# Patient Record
Sex: MALE | Race: WHITE | NOT HISPANIC OR LATINO | Employment: FULL TIME | URBAN - METROPOLITAN AREA
[De-identification: names, ages, dates, MRNs, and addresses within clinical notes are randomized per-mention and may not be internally consistent; named-entity substitution may affect disease eponyms.]

---

## 2018-02-02 ENCOUNTER — OFFICE VISIT (OUTPATIENT)
Dept: MEDICAL GROUP | Facility: MEDICAL CENTER | Age: 33
End: 2018-02-02
Payer: COMMERCIAL

## 2018-02-02 VITALS
OXYGEN SATURATION: 96 % | TEMPERATURE: 98.1 F | HEART RATE: 86 BPM | BODY MASS INDEX: 29.44 KG/M2 | WEIGHT: 187.6 LBS | SYSTOLIC BLOOD PRESSURE: 112 MMHG | DIASTOLIC BLOOD PRESSURE: 68 MMHG | HEIGHT: 67 IN | RESPIRATION RATE: 16 BRPM

## 2018-02-02 DIAGNOSIS — Z76.89 ENCOUNTER TO ESTABLISH CARE: ICD-10-CM

## 2018-02-02 DIAGNOSIS — Z00.00 HEALTH CARE MAINTENANCE: ICD-10-CM

## 2018-02-02 DIAGNOSIS — G25.81 RLS (RESTLESS LEGS SYNDROME): ICD-10-CM

## 2018-02-02 DIAGNOSIS — F31.9 BIPOLAR 1 DISORDER (HCC): ICD-10-CM

## 2018-02-02 DIAGNOSIS — B20 HIV (HUMAN IMMUNODEFICIENCY VIRUS INFECTION) (HCC): ICD-10-CM

## 2018-02-02 PROCEDURE — 99204 OFFICE O/P NEW MOD 45 MIN: CPT | Performed by: INTERNAL MEDICINE

## 2018-02-02 RX ORDER — HYDROXYZINE HYDROCHLORIDE 25 MG/1
25 TABLET, FILM COATED ORAL 3 TIMES DAILY PRN
COMMUNITY
End: 2019-06-25

## 2018-02-02 RX ORDER — LAMOTRIGINE 200 MG/1
200 TABLET ORAL DAILY
COMMUNITY
End: 2018-12-24

## 2018-02-02 RX ORDER — PRAMIPEXOLE DIHYDROCHLORIDE 0.5 MG/1
0.5 TABLET ORAL 3 TIMES DAILY
COMMUNITY
End: 2019-06-25

## 2018-02-02 ASSESSMENT — PATIENT HEALTH QUESTIONNAIRE - PHQ9
6. FEELING BAD ABOUT YOURSELF - OR THAT YOU ARE A FAILURE OR HAVE LET YOURSELF OR YOUR FAMILY DOWN: 1
9. THOUGHTS THAT YOU WOULD BE BETTER OFF DEAD, OR OF HURTING YOURSELF: 0
CLINICAL INTERPRETATION OF PHQ2 SCORE: 0
3. TROUBLE FALLING OR STAYING ASLEEP OR SLEEPING TOO MUCH: 0
2. FEELING DOWN, DEPRESSED, IRRITABLE, OR HOPELESS: 1
1. LITTLE INTEREST OR PLEASURE IN DOING THINGS: 0
5. POOR APPETITE OR OVEREATING: 0
7. TROUBLE CONCENTRATING ON THINGS, SUCH AS READING THE NEWSPAPER OR WATCHING TELEVISION: 0
SUM OF ALL RESPONSES TO PHQ QUESTIONS 1-9: 2
4. FEELING TIRED OR HAVING LITTLE ENERGY: 0
SUM OF ALL RESPONSES TO PHQ9 QUESTIONS 1 AND 2: 1
8. MOVING OR SPEAKING SO SLOWLY THAT OTHER PEOPLE COULD HAVE NOTICED. OR THE OPPOSITE, BEING SO FIGETY OR RESTLESS THAT YOU HAVE BEEN MOVING AROUND A LOT MORE THAN USUAL: 0

## 2018-02-02 NOTE — LETTER
[facility]  Melissa Hernandez M.D.  [address]  [facility] [address]  Fax: [phone]   Authorization for Release/Disclosure of   Protected Health Information   Name: MAURICE ARANA : 1985 SSN: [id number]   Address: [address] [place] [address] Phone:    [phone] (home)    I authorize the entity listed below to release/disclose the PHI below to:   [facility]/Melissa Hernandez M.D. and Melissa Hernandez M.D.   Provider or Entity Name:     Address   City, State, Zip   Phone:      Fax:     Reason for request: continuity of care   Information to be released:    [  ] LAST COLONOSCOPY,  including any PATH REPORT and follow-up  [  ] LAST FIT/COLOGUARD RESULT [  ] LAST DEXA  [  ] LAST MAMMOGRAM  [  ] LAST PAP  [  ] LAST LABS [  ] RETINA EXAM REPORT  [  ] IMMUNIZATION RECORDS  [ x ] Release all info      [  ] Check here and initial the line next to each item to release ALL health information INCLUDING  _____ Care and treatment for drug and / or alcohol abuse  _____ HIV testing, infection status, or AIDS  _____ Genetic Testing    DATES OF SERVICE OR TIME PERIOD TO BE DISCLOSED: _____________  I understand and acknowledge that:  * This Authorization may be revoked at any time by you in writing, except if your health information has already been used or disclosed.  * Your health information that will be used or disclosed as a result of you signing this authorization could be re-disclosed by the recipient. If this occurs, your re-disclosed health information may no longer be protected by State or Federal laws.  * You may refuse to sign this Authorization. Your refusal will not affect your ability to obtain treatment.  * This Authorization becomes effective upon signing and will  on (date) __________.      If no date is indicated, this Authorization will  one (1) year from the signature date.    Name: Maurice Arana    Signature:   Date:     2018          PLEASE FAX REQUESTED RECORDS BACK TO: [phone]

## 2018-02-02 NOTE — PROGRESS NOTES
CHIEF COMPLAINT  Chief Complaint   Patient presents with   • Establish Care   HIV    HPI  Patient is a 32 y.o. male patient who presents today for the following     HIV  Dg: in 2007  The last labs: 5/17  Denies:  - Skin rash  - Fatigue  - Swollen glands  - Frequent infections  Medications: Gbcnkqvlzy-Dsvktapl-Ldamytc AF (ODEFSEY) 200-25-25 MG Tab  He has been followed up by ID.    RLS  The patient has history of restless leg syndrome, on Mirapex, 0.5 mg that;  - Symptoms controlled.     Bipolar  Onset:  Since childhood   Mood currently does not affect: work, relationships, social activities.  Counseling: Y  Current medications:  lamotrigine 200 mg daily    Risk factors:   · Depression  · H/o phobia: social, agoraphobia: no  · H/o panic attacks: no  · H/o hypomanic or manic episode: yes  · Substance abuse  (alcohol,  prescription drugs caffeine, tobacco): no  · Family support: yes  · Living alone:  no  · Family history of psych disorders: mother depression; sister; both with suicide attempt  · Stress: no   · PMH of abuse (sexual, physical, emotional abuse; neglect): no    LEONID-7 score:  1/18   1. Feeling nervous, anxious, or on edge     2. Not being able to stop or control worrying    3. Worrying too much about different things    4. Trouble relaxing    5. Being so restless that it is hard to sit still    6. Becoming easily annoyed or irritable    7. Feeling afraid as if something awful might happen    Total score 0     Depression Screen (PHQ-2/PHQ-9) 2/2/2018 2/2/2018   PHQ-2 Total Score - 1   PHQ-2 Total Score 0 -   PHQ-9 Total Score - 2     Suicidal ideation: denies wishing to be dead, thinking about death, intent to commit suicide, previous suicide attempt.    Reviewed PMH, PSH, FH, SH, ALL, HCM/IMM, no changes  Reviewed MEDS, no changes    Patient Active Problem List    Diagnosis Date Noted   • HIV (human immunodeficiency virus infection) (CMS-HCC) 02/02/2018     CURRENT MEDICATIONS  Current Outpatient  "Prescriptions   Medication Sig Dispense Refill   • Cpjxofrfre-Ygdmfecx-Znniniy AF (ODEFSEY) 200-25-25 MG Tab Take  by mouth.     • pramipexole (MIRAPEX) 0.5 MG Tab Take 0.5 mg by mouth 3 times a day.     • hydrOXYzine HCl (ATARAX) 25 MG Tab Take 25 mg by mouth 3 times a day as needed for Itching.     • lamotrigine (LAMICTAL) 200 MG tablet Take 200 mg by mouth every day.       No current facility-administered medications for this visit.      ALLERGIES  Allergies: Amoxicillin  PAST MEDICAL HISTORY  Past Medical History:   Diagnosis Date   • HIV (human immunodeficiency virus infection) (CMS-HCC)      SURGICAL HISTORY  He  has no past surgical history on file.  SOCIAL HISTORY  Social History   Substance Use Topics   • Smoking status: Never Smoker   • Smokeless tobacco: Never Used   • Alcohol use 1.2 oz/week     2 Cans of beer per week     Social History     Social History Narrative   • No narrative on file     FAMILY HISTORY  Family History   Problem Relation Age of Onset   • Thyroid Mother    • Hypertension Father    • Blood Disease Sister      Sicle cell carrier     Family Status   Relation Status   • Mother    • Father    • Sister      ROS   Constitutional: Negative for fever, chills.  HENT: Negative for congestion, sore throat.  Eyes: Negative for blurred vision.   Respiratory: Negative for cough, shortness of breath.  Cardiovascular: Negative for chest pain, palpitations.   Gastrointestinal: Negative for heartburn, nausea, abdominal pain.   Genitourinary: Negative for dysuria.  Musculoskeletal: Negative for significant myalgias, back pain and joint pain.   Skin: Negative for rash and itching.   Neuro: Negative for dizziness, weakness and headaches. And per HPI.  Endo/Heme/Allergies: Does not bruise/bleed easily.   ID: as above.  Psychiatric/Behavioral: as above.    PHYSICAL EXAM   Blood pressure 112/68, pulse 86, temperature 36.7 °C (98.1 °F), resp. rate 16, height 1.702 m (5' 7\"), weight 85.1 kg (187 lb 9.6 oz), " SpO2 96 %. Body mass index is 29.38 kg/m².  General:  NAD, well appearing  HEENT:   NC/AT, PERRLA, EOMI, TMs are clear. Oropharyngeal mucosa is pink,  without lesions;  no cervical / supraclavicular  lymphadenopathy, no thyromegaly.    Cardiovascular: RRR.   No m/r/g. No carotid bruits .      Lungs:   CTAB, no w/r/r, no respiratory distress.  Abdomen: Soft, NT/ND + BS; no suprapubic tenderness; no masses or hepatosplenomegaly.  Extremities:  2+ DP and radial pulses bilaterally.  No c/c/e.   Skin:  Warm, dry.  No erythema. No rash.   Neurologic: Alert & oriented x 3.  No focal deficits.  Psychiatric:  Affect normal, mood normal, judgment normal.    LABS     Pending.    IMAGING     None    ASSESMENT AND PLAN        1. HIV (human immunodeficiency virus infection) (CMS-HCC)  Stable, no symptoms.    - REFERRAL TO INFECTIOUS DISEASE  - CBC WITH DIFFERENTIAL; Future  - COMP METABOLIC PANEL; Future  - CD4/CD8 RATIO PROFILE; Future    Medication refill:  - Otcnotlcyy-Ilmynlad-Hgcvink AF (ODEFSEY) 200-25-25 MG Tab; Take 1 Tab by mouth every bedtime.  Dispense: 90 Tab; Refill: 0  - [facility] HIV VIRAL LOAD RNA QT - REF  - HIV ANTIBODIES; Future    2. Bipolar 1 disorder (CMS-HCC)  Control, continue current treatment  - REFERRAL TO PSYCHIATRY    3. RLS (restless legs syndrome)  Controlled, continue current treatment    4. Health care maintenance  Pending records    5. Encounter to establish care  Reviewed PMH, PSH, FH, SH, ALL, MEDS, HCM/IMM.   Advised healthy habits, diet, exercise.  Release form for old records: signed    Counseling:   - Smoking:  Nonsmoker    Followup: Return in about 6 months (around 8/2/2018) for Short.    All questions are answered.    Please note that this dictation was created using voice recognition software, and that there might be errors of jocelyn and possibly content.

## 2018-02-05 ENCOUNTER — TELEPHONE (OUTPATIENT)
Dept: INTERNAL MEDICINE | Facility: MEDICAL CENTER | Age: 33
End: 2018-02-05

## 2018-02-05 DIAGNOSIS — B20 HIV DISEASE (HCC): ICD-10-CM

## 2018-02-05 NOTE — TELEPHONE ENCOUNTER
HIV referral received, I reviewed chart and I added HIV RNA to labs to obtain prior to appointment

## 2018-02-16 NOTE — TELEPHONE ENCOUNTER
Called pt and l/m. Dr Reno has referred him to Dr Miles. Dr Miles ordered additional lab test. We just need all labs done prior to scheduling an appt. Also asking pt to return my call.     Cc: Dr Melissa Hernandez

## 2018-02-21 NOTE — TELEPHONE ENCOUNTER
Called pt and l/m. Asking for him to return my call to schedule an appt with Dr Miles and he needs to get labs done prior to making his appt. That I will hold on to his referral until the beginning of March, then I will shred the referral.

## 2018-02-28 ENCOUNTER — TELEPHONE (OUTPATIENT)
Dept: MEDICAL GROUP | Facility: MEDICAL CENTER | Age: 33
End: 2018-02-28

## 2018-03-01 NOTE — TELEPHONE ENCOUNTER
Phone Number Called: [phone] (home)      Message: Patient called and left message. I have called patient back but it has gone straight to  and I have left a message for him to call us back.     Left Message for patient to call back: yes

## 2018-03-02 NOTE — TELEPHONE ENCOUNTER
Called and spoke with pt. Notified pt he would need to get labs done prior to his appt. He said he will get them done. He will call me back on Monday to schedule an appt.(He said he has to look at his work schedule first.)

## 2018-03-09 ENCOUNTER — TELEPHONE (OUTPATIENT)
Dept: MEDICAL GROUP | Facility: MEDICAL CENTER | Age: 33
End: 2018-03-09

## 2018-03-09 NOTE — TELEPHONE ENCOUNTER
Phone Number Called: [phone]     Message: Isabel called pharmacy to fill the rx Javy    Left Message for patient to call back: no

## 2018-03-09 NOTE — TELEPHONE ENCOUNTER
Called and spoke with pt. Asked if he was able to get the labs done. He said he has not. But he will. I notify him this would be my last attempt to remind him to have labs done. He would need to notify me when lab work has been completed.  I told him than I will notify Dr Miles to review pt's lab results/referral. He understood.

## 2018-03-12 DIAGNOSIS — B20 HIV (HUMAN IMMUNODEFICIENCY VIRUS INFECTION) (HCC): ICD-10-CM

## 2018-09-06 DIAGNOSIS — B20 HIV (HUMAN IMMUNODEFICIENCY VIRUS INFECTION) (HCC): ICD-10-CM

## 2018-09-06 RX ORDER — POLYMYXIN B SULFATE, BACITRACIN ZINC AND NEOMYCIN SULFATE 400; 3.5; 5 [USP'U]/G; MG/G; [USP'U]/G
1 OINTMENT TOPICAL
Qty: 83 TAB | Refills: 0 | Status: SHIPPED | OUTPATIENT
Start: 2018-09-06 | End: 2018-12-24 | Stop reason: SDUPTHER

## 2018-09-06 NOTE — TELEPHONE ENCOUNTER
Was the patient seen in the last year in this department? Yes 2/2/18    Does patient have an active prescription for medications requested? Yes    Received Request Via: Pharmacy

## 2018-12-24 ENCOUNTER — OFFICE VISIT (OUTPATIENT)
Dept: MEDICAL GROUP | Facility: MEDICAL CENTER | Age: 33
End: 2018-12-24
Payer: COMMERCIAL

## 2018-12-24 VITALS
BODY MASS INDEX: 28.79 KG/M2 | SYSTOLIC BLOOD PRESSURE: 120 MMHG | OXYGEN SATURATION: 99 % | TEMPERATURE: 98.3 F | RESPIRATION RATE: 12 BRPM | WEIGHT: 183.42 LBS | HEIGHT: 67 IN | DIASTOLIC BLOOD PRESSURE: 70 MMHG | HEART RATE: 54 BPM

## 2018-12-24 DIAGNOSIS — B20 HIV (HUMAN IMMUNODEFICIENCY VIRUS INFECTION) (HCC): ICD-10-CM

## 2018-12-24 DIAGNOSIS — F31.9 BIPOLAR 1 DISORDER (HCC): ICD-10-CM

## 2018-12-24 DIAGNOSIS — G25.81 RLS (RESTLESS LEGS SYNDROME): ICD-10-CM

## 2018-12-24 DIAGNOSIS — Z00.00 HEALTH CARE MAINTENANCE: ICD-10-CM

## 2018-12-24 PROCEDURE — 99214 OFFICE O/P EST MOD 30 MIN: CPT | Performed by: INTERNAL MEDICINE

## 2018-12-24 ASSESSMENT — PATIENT HEALTH QUESTIONNAIRE - PHQ9
1. LITTLE INTEREST OR PLEASURE IN DOING THINGS: 0
2. FEELING DOWN, DEPRESSED, IRRITABLE, OR HOPELESS: 0
SUM OF ALL RESPONSES TO PHQ9 QUESTIONS 1 AND 2: 0

## 2018-12-24 NOTE — PROGRESS NOTES
CHIEF COMPLAINT  Chief Complaint   Patient presents with   • Medication Refill   HIV    HPI  Patient is a 33 y.o. male patient who presents today for the following     HIV  Dg: in 2007  The last labs: 5/17  Denies:  - Skin rash  - Fatigue  - Swollen glands  - Frequent infections  Medications: Lrmvygynhq-Ojfdzirt-Chgecnq AF (Odefsey) 200-25-25 Mg Tab  He had been followed up by ID in the past.     RLS  The patient has history of restless leg syndrome, on Mirapex, 0.5 mg that;  - Symptoms controlled.      Bipolar  Onset: Since childhood   Mood does not affect: work, relationships, social activities.  Counseling: Y  Current medications: lamotrigine 200 mg daily     Risk factors:   • Depression  • H/o phobia: social, agoraphobia: no  • H/o panic attacks: no  • H/o hypomanic or manic episode: yes  • Substance abuse (alcohol, prescription drugs caffeine, tobacco): no  • Family support: yes  • Living alone: no  • Family history of psych disorders: mother depression; sister; both with suicide attempt  • Stress: no   • PMH of abuse (sexual, physical, emotional abuse; neglect): no     LEONID-7 score:  12/18   1. Feeling nervous, anxious, or on edge                2. Not being able to stop or control worrying     3. Worrying too much about different things     4. Trouble relaxing     5. Being so restless that it is hard to sit still     6. Becoming easily annoyed or irritable     7. Feeling afraid as if something awful might happen     Total score 0      Depression Screen (PHQ-2/PHQ-9) 2/2/2018 2/2/2018 12/24/2018   PHQ-2 Total Score - 1 0   PHQ-2 Total Score 0 - -   PHQ-9 Total Score - 2 -     Reviewed PMH, PSH, FH, SH, ALL, HCM/IMM, no changes  Reviewed MEDS, no changes    Patient Active Problem List    Diagnosis Date Noted   • HIV (human immunodeficiency virus infection) ([facility]) 02/02/2018   • Bipolar 1 disorder ([facility]) 02/02/2018   • RLS (restless legs syndrome) 02/02/2018   • Health care maintenance 02/02/2018     CURRENT  MEDICATIONS  Current Outpatient Prescriptions   Medication Sig Dispense Refill   • Ucvnxkdfrl-Dxgitetu-Mcxwmhq AF (ODEFSEY) 200-25-25 MG Tab Take 1 Tab by mouth every bedtime. 83 Tab 1   • pramipexole (MIRAPEX) 0.5 MG Tab Take 0.5 mg by mouth 3 times a day.     • hydrOXYzine HCl (ATARAX) 25 MG Tab Take 25 mg by mouth 3 times a day as needed for Itching.     • lamotrigine (LAMICTAL) 200 MG tablet Take 200 mg by mouth every day.       No current facility-administered medications for this visit.      ALLERGIES  Allergies: Amoxicillin  PAST MEDICAL HISTORY  Past Medical History:   Diagnosis Date   • HIV (human immunodeficiency virus infection) (HCC)      SURGICAL HISTORY  He  has no past surgical history on file.  SOCIAL HISTORY  Social History   Substance Use Topics   • Smoking status: Never Smoker   • Smokeless tobacco: Never Used   • Alcohol use 1.2 oz/week     2 Cans of beer per week     Social History     Social History Narrative   • No narrative on file     FAMILY HISTORY  Family History   Problem Relation Age of Onset   • Thyroid Mother    • Hypertension Father    • Blood Disease Sister         Sicle cell carrier     Family Status   Relation Status   • Mo (Not Specified)   • Fa (Not Specified)   • Sis (Not Specified)       ROS   Constitutional: Negative for fever, chills.  HENT: Negative for congestion, sore throat.  Eyes: Negative for blurred vision.   Respiratory: Negative for cough, shortness of breath.  Cardiovascular: Negative for chest pain, palpitations.   Gastrointestinal: Negative for heartburn, nausea, abdominal pain.   Genitourinary: Negative for dysuria.  Musculoskeletal: Negative for significant myalgias, back pain and joint pain.   Skin: Negative for rash and itching.   Neuro: Negative for dizziness, weakness and headaches.   Endo/Heme/Allergies: Does not bruise/bleed easily.   Inf: as above.  Psychiatric/Behavioral: as above.    PHYSICAL EXAM   Blood pressure 120/70, pulse (!) 54, temperature 36.8  "°C (98.3 °F), temperature source Temporal, resp. rate 12, height 1.702 m (5' 7\"), weight 83.2 kg (183 lb 6.8 oz), SpO2 99 %. Body mass index is 28.73 kg/m².  General:  NAD, well appearing  HEENT:   NC/AT, PERRLA, EOMI, TMs are clear. Oropharyngeal mucosa is pink,  without lesions;  no cervical / supraclavicular  lymphadenopathy, no thyromegaly.    Cardiovascular: RRR.   No m/r/g. No carotid bruits .      Lungs:   CTAB, no w/r/r, no respiratory distress.  Abdomen: Soft, NT/ND + BS; no suprapubic tenderness; no masses or hepatosplenomegaly.  Extremities:  2+ DP and radial pulses bilaterally.  No c/c/e.   Skin:  Warm, dry.  No erythema. No rash.   Neurologic: Alert & oriented x 3.  No focal deficits.  Psychiatric:  Affect normal, mood normal, judgment normal.    LABS     Pending    IMAGING     None    ASSESMENT AND PLAN        1. HIV (human immunodeficiency virus infection) (HCC)  Pending labs:  - Bzjnsbuclz-Kjylnwtt-Qzrqftb AF (ODEFSEY) 200-25-25 MG Tab; Take 1 Tab by mouth every bedtime.  Dispense: 83 Tab; Refill: 1  - CBC WITH DIFFERENTIAL; Future  - CD4/CD8 RATIO PROFILE; Future    2. RLS (restless legs syndrome)  Controlled, continue current treatment    3. Bipolar 1 disorder (HCC)  Controlled, continue current treatment    4. Health care maintenance  Advised immunizations x4    Counseling:   - Smoking:  Nonsmoker    Followup: Return in about 6 months (around 6/24/2019), or if symptoms worsen or fail to improve.    Time spent 25 minutes face to face, with > 50% spent counseling and coordinating care.  All questions are answered.    Please note that this dictation was created using voice recognition software, and that there might be errors of jocelyn and possibly content.    "

## 2019-02-20 ENCOUNTER — HOSPITAL ENCOUNTER (OUTPATIENT)
Dept: LAB | Facility: MEDICAL CENTER | Age: 34
End: 2019-02-20
Attending: INTERNAL MEDICINE
Payer: COMMERCIAL

## 2019-02-20 DIAGNOSIS — B20 HIV (HUMAN IMMUNODEFICIENCY VIRUS INFECTION) (HCC): ICD-10-CM

## 2019-02-20 LAB
BASOPHILS # BLD AUTO: 0.6 % (ref 0–1.8)
BASOPHILS # BLD: 0.03 K/UL (ref 0–0.12)
EOSINOPHIL # BLD AUTO: 0.04 K/UL (ref 0–0.51)
EOSINOPHIL NFR BLD: 0.8 % (ref 0–6.9)
ERYTHROCYTE [DISTWIDTH] IN BLOOD BY AUTOMATED COUNT: 43.8 FL (ref 35.9–50)
HCT VFR BLD AUTO: 50 % (ref 42–52)
HGB BLD-MCNC: 17 G/DL (ref 14–18)
IMM GRANULOCYTES # BLD AUTO: 0.01 K/UL (ref 0–0.11)
IMM GRANULOCYTES NFR BLD AUTO: 0.2 % (ref 0–0.9)
LYMPHOCYTES # BLD AUTO: 1.71 K/UL (ref 1–4.8)
LYMPHOCYTES NFR BLD: 33.6 % (ref 22–41)
MCH RBC QN AUTO: 31 PG (ref 27–33)
MCHC RBC AUTO-ENTMCNC: 34 G/DL (ref 33.7–35.3)
MCV RBC AUTO: 91.2 FL (ref 81.4–97.8)
MONOCYTES # BLD AUTO: 0.36 K/UL (ref 0–0.85)
MONOCYTES NFR BLD AUTO: 7.1 % (ref 0–13.4)
NEUTROPHILS # BLD AUTO: 2.94 K/UL (ref 1.82–7.42)
NEUTROPHILS NFR BLD: 57.7 % (ref 44–72)
NRBC # BLD AUTO: 0 K/UL
NRBC BLD-RTO: 0 /100 WBC
PLATELET # BLD AUTO: 195 K/UL (ref 164–446)
PMV BLD AUTO: 9.9 FL (ref 9–12.9)
RBC # BLD AUTO: 5.48 M/UL (ref 4.7–6.1)
WBC # BLD AUTO: 5.1 K/UL (ref 4.8–10.8)

## 2019-02-20 PROCEDURE — 36415 COLL VENOUS BLD VENIPUNCTURE: CPT

## 2019-02-20 PROCEDURE — 85025 COMPLETE CBC W/AUTO DIFF WBC: CPT

## 2019-02-20 PROCEDURE — 86359 T CELLS TOTAL COUNT: CPT

## 2019-02-20 PROCEDURE — 86360 T CELL ABSOLUTE COUNT/RATIO: CPT

## 2019-02-23 LAB
ANNOTATION COMMENT IMP: NORMAL
CD3 CELLS # BLD: 1145 CELLS/UL (ref 570–2400)
CD3+CD4+ CELLS # BLD: 610 CELLS/UL (ref 430–1800)
CD3+CD4+ CELLS/CD3+CD8+ CLL BLD: 1.09 RATIO (ref 0.8–3.9)
CD3+CD8+ CELLS # BLD: 561 CELLS/UL (ref 210–1200)

## 2019-02-25 ENCOUNTER — TELEPHONE (OUTPATIENT)
Dept: MEDICAL GROUP | Facility: MEDICAL CENTER | Age: 34
End: 2019-02-25

## 2019-02-26 NOTE — TELEPHONE ENCOUNTER
----- Message from Melissa Hernandez M.D. sent at 2/24/2019  5:25 PM PST -----  Please, notify pt that labs ar enormal, to repeta before the next OV, thanks. Dr Reno

## 2019-02-26 NOTE — TELEPHONE ENCOUNTER
Phone Number Called: [phone] (home)     Message: LM for patient to call back    Left Message for patient to call back: yes

## 2019-02-28 NOTE — TELEPHONE ENCOUNTER
Phone Number Called: [phone] (home)     Message: Patient informed of normal results    Left Message for patient to call back: yes

## 2019-06-25 ENCOUNTER — HOSPITAL ENCOUNTER (OUTPATIENT)
Dept: LAB | Facility: MEDICAL CENTER | Age: 34
End: 2019-06-25
Attending: INTERNAL MEDICINE
Payer: COMMERCIAL

## 2019-06-25 ENCOUNTER — OFFICE VISIT (OUTPATIENT)
Dept: MEDICAL GROUP | Facility: MEDICAL CENTER | Age: 34
End: 2019-06-25
Payer: COMMERCIAL

## 2019-06-25 VITALS
OXYGEN SATURATION: 94 % | WEIGHT: 186.51 LBS | HEART RATE: 63 BPM | BODY MASS INDEX: 29.27 KG/M2 | DIASTOLIC BLOOD PRESSURE: 60 MMHG | HEIGHT: 67 IN | TEMPERATURE: 96.9 F | SYSTOLIC BLOOD PRESSURE: 102 MMHG

## 2019-06-25 DIAGNOSIS — F31.9 BIPOLAR 1 DISORDER (HCC): ICD-10-CM

## 2019-06-25 DIAGNOSIS — B20 HIV (HUMAN IMMUNODEFICIENCY VIRUS INFECTION) (HCC): ICD-10-CM

## 2019-06-25 DIAGNOSIS — G25.81 RLS (RESTLESS LEGS SYNDROME): ICD-10-CM

## 2019-06-25 DIAGNOSIS — Z00.00 HEALTH CARE MAINTENANCE: ICD-10-CM

## 2019-06-25 LAB
ALBUMIN SERPL BCP-MCNC: 4 G/DL (ref 3.2–4.9)
ALBUMIN/GLOB SERPL: 1.6 G/DL
ALP SERPL-CCNC: 69 U/L (ref 30–99)
ALT SERPL-CCNC: 17 U/L (ref 2–50)
ANION GAP SERPL CALC-SCNC: 7 MMOL/L (ref 0–11.9)
AST SERPL-CCNC: 20 U/L (ref 12–45)
BASOPHILS # BLD AUTO: 0.7 % (ref 0–1.8)
BASOPHILS # BLD: 0.04 K/UL (ref 0–0.12)
BILIRUB SERPL-MCNC: 0.7 MG/DL (ref 0.1–1.5)
BUN SERPL-MCNC: 12 MG/DL (ref 8–22)
CALCIUM SERPL-MCNC: 8.8 MG/DL (ref 8.5–10.5)
CHLORIDE SERPL-SCNC: 109 MMOL/L (ref 96–112)
CO2 SERPL-SCNC: 23 MMOL/L (ref 20–33)
CREAT SERPL-MCNC: 0.86 MG/DL (ref 0.5–1.4)
EOSINOPHIL # BLD AUTO: 0.07 K/UL (ref 0–0.51)
EOSINOPHIL NFR BLD: 1.2 % (ref 0–6.9)
ERYTHROCYTE [DISTWIDTH] IN BLOOD BY AUTOMATED COUNT: 40.8 FL (ref 35.9–50)
GLOBULIN SER CALC-MCNC: 2.5 G/DL (ref 1.9–3.5)
GLUCOSE SERPL-MCNC: 95 MG/DL (ref 65–99)
HCT VFR BLD AUTO: 46.5 % (ref 42–52)
HGB BLD-MCNC: 15.7 G/DL (ref 14–18)
IMM GRANULOCYTES # BLD AUTO: 0.03 K/UL (ref 0–0.11)
IMM GRANULOCYTES NFR BLD AUTO: 0.5 % (ref 0–0.9)
LYMPHOCYTES # BLD AUTO: 1.55 K/UL (ref 1–4.8)
LYMPHOCYTES NFR BLD: 27.5 % (ref 22–41)
MCH RBC QN AUTO: 30.6 PG (ref 27–33)
MCHC RBC AUTO-ENTMCNC: 33.8 G/DL (ref 33.7–35.3)
MCV RBC AUTO: 90.6 FL (ref 81.4–97.8)
MONOCYTES # BLD AUTO: 0.37 K/UL (ref 0–0.85)
MONOCYTES NFR BLD AUTO: 6.6 % (ref 0–13.4)
NEUTROPHILS # BLD AUTO: 3.57 K/UL (ref 1.82–7.42)
NEUTROPHILS NFR BLD: 63.5 % (ref 44–72)
NRBC # BLD AUTO: 0 K/UL
NRBC BLD-RTO: 0 /100 WBC
PLATELET # BLD AUTO: 166 K/UL (ref 164–446)
PMV BLD AUTO: 9.3 FL (ref 9–12.9)
POTASSIUM SERPL-SCNC: 4 MMOL/L (ref 3.6–5.5)
PROT SERPL-MCNC: 6.5 G/DL (ref 6–8.2)
RBC # BLD AUTO: 5.13 M/UL (ref 4.7–6.1)
SODIUM SERPL-SCNC: 139 MMOL/L (ref 135–145)
WBC # BLD AUTO: 5.6 K/UL (ref 4.8–10.8)

## 2019-06-25 PROCEDURE — 36415 COLL VENOUS BLD VENIPUNCTURE: CPT

## 2019-06-25 PROCEDURE — 99214 OFFICE O/P EST MOD 30 MIN: CPT | Performed by: INTERNAL MEDICINE

## 2019-06-25 PROCEDURE — 85025 COMPLETE CBC W/AUTO DIFF WBC: CPT

## 2019-06-25 PROCEDURE — 86359 T CELLS TOTAL COUNT: CPT

## 2019-06-25 PROCEDURE — 80053 COMPREHEN METABOLIC PANEL: CPT

## 2019-06-25 PROCEDURE — 86360 T CELL ABSOLUTE COUNT/RATIO: CPT

## 2019-06-25 ASSESSMENT — PATIENT HEALTH QUESTIONNAIRE - PHQ9: CLINICAL INTERPRETATION OF PHQ2 SCORE: 0

## 2019-06-25 NOTE — PROGRESS NOTES
CHIEF COMPLAINT  Chief Complaint   Patient presents with   • Follow-Up     6 months   HIV    HPI  Maurice Arana is a 33 y.o. male who presents today for the following     HIV  Dg: in 2007  The last labs: 2/2019    Component      Latest Ref Rng & Units 2/20/2019   Cd4 -T4 [place] Cells      430 - 1800 cells/uL 610   Cd8 -T8 Suppressor Cells      210 - 1200 cells/uL 561   Cd3 Ct      570 - 2400 cells/uL 1145   Cd4-Cd8 Ratio      0.80 - 3.90 ratio 1.09   Interpretation       See Note     Denies:  - Skin rash  - Fatigue  - Swollen glands  - Frequent infections  Medications: Mctejpevmf-Gucfuufk-Cmazbaq AF (Odefsey) 200-25-25 Mg Tab  He had been followed up by ID in the past.     RLS  The patient has history of restless leg syndrome, was on Mirapex, 0.5 mg;  - Symptoms controlled.      Bipolar  Interval course  -Stable   -Does not feel depressed or anxious  No medications    Onset: Since childhood   Mood does not affect: work, relationships, social activities.  Counseling: Y  Previous medication: Was lamotrigine 200 mg daily  Current medications: none     Risk factors:   • Depression  • H/o phobia: social, agoraphobia: no  • H/o panic attacks: no  • H/o hypomanic or manic episode: yes  • Substance abuse (alcohol, prescription drugs caffeine, tobacco): no  • Family support: yes  • Living alone: no  • Family history of psych disorders: mother depression; sister; both with suicide attempt  • Stress: no   • PMH of abuse (sexual, physical, emotional abuse; neglect): no     LEONID-7 score:  6/19   1. Feeling nervous, anxious, or on edge      2. Not being able to stop or control worrying     3. Worrying too much about different things     4. Trouble relaxing     5. Being so restless that it is hard to sit still     6. Becoming easily annoyed or irritable     7. Feeling afraid as if something awful might happen     Total score 0     Reviewed PMH, PSH, FH, SH, ALL, HCM/IMM  Reviewed MEDS, updated    Patient Active Problem List     Diagnosis Date Noted   • HIV (human immunodeficiency virus infection) ([facility]) 02/02/2018   • Bipolar 1 disorder ([facility]) 02/02/2018   • RLS (restless legs syndrome) 02/02/2018   • Health care maintenance 02/02/2018     CURRENT MEDICATIONS  Current Outpatient Prescriptions   Medication Sig Dispense Refill   • Idsqconqce-Yiqasktx-Faayhhg AF (ODEFSEY) 200-25-25 MG Tab Take 1 Tab by mouth every bedtime. 83 Tab 0     No current facility-administered medications for this visit.      ALLERGIES  Allergies: Amoxicillin  PAST MEDICAL HISTORY  Past Medical History:   Diagnosis Date   • HIV (human immunodeficiency virus infection) ([facility])      SURGICAL HISTORY  He  has no past surgical history on file.  SOCIAL HISTORY  Social History   Substance Use Topics   • Smoking status: Never Smoker   • Smokeless tobacco: Never Used   • Alcohol use 1.2 oz/week     2 Cans of beer per week     Social History     Social History Narrative   • No narrative on file     FAMILY HISTORY  Family History   Problem Relation Age of Onset   • Thyroid Mother    • Hypertension Father    • Blood Disease Sister         Sicle cell carrier   • Diabetes Maternal Grandfather      Family Status   Relation Status   • Mo (Not Specified)   • Fa (Not Specified)   • Sis (Not Specified)   • MGFa (Not Specified)     ROS   Constitutional: Negative for fever, chills, fatigue.  HENT: Negative for congestion, sore throat.  Eyes: Negative for vision problems.   Respiratory: Negative for cough, shortness of breath.  Cardiovascular: Negative for chest pain, palpitations.   Gastrointestinal: Negative for heartburn, nausea, abdominal pain.   Genitourinary: Negative for dysuria.  Musculoskeletal: Negative for significant myalgia, back and joint pain.   Skin: Negative for rash.   Neuro: Negative for dizziness, weakness and headaches.   Endo/Heme/Allergies: Does not bruise/bleed easily.   Psychiatric/Behavioral: Negative for depression.    PHYSICAL EXAM   /60 (BP Cuff Size:  "Adult)   Pulse 63   Temp 36.1 °C (96.9 °F) (Temporal)   Ht 1.702 m (5' 7\")   Wt 84.6 kg (186 lb 8.2 oz)   SpO2 94%  Body mass index is 29.21 kg/m².  General:  NAD, well appearing  HEENT:   NC/AT, PERRLA, EOMI, TMs are clear. Oropharyngeal mucosa is pink,  without lesions;  no cervical / supraclavicular  lymphadenopathy, no thyromegaly.    Cardiovascular: RRR.   No m/r/g.       Lungs:   CTAB, no w/r/r, no respiratory distress.  Abdomen: Soft, NT/ND; no hepatosplenomegaly.  Extremities:  2+ DP and radial pulses bilaterally.  No c/c/e.   Skin:  Warm, dry.  No erythema. No rash.   Neurologic: Alert & oriented x 3. CN II-XII grossly intact. No focal deficits.  Psychiatric:  Affect normal, mood normal, judgment normal.    Labs     Labs are reviewed and discussed with a patient    Lab Results   Component Value Date/Time    WBC 5.1 02/20/2019 11:06 AM    RBC 5.48 02/20/2019 11:06 AM    HEMOGLOBIN 17.0 02/20/2019 11:06 AM    HEMATOCRIT 50.0 02/20/2019 11:06 AM    MCV 91.2 02/20/2019 11:06 AM    MCH 31.0 02/20/2019 11:06 AM    MCHC 34.0 02/20/2019 11:06 AM    MPV 9.9 02/20/2019 11:06 AM    NEUTSPOLYS 57.70 02/20/2019 11:06 AM    LYMPHOCYTES 33.60 02/20/2019 11:06 AM    MONOCYTES 7.10 02/20/2019 11:06 AM    EOSINOPHILS 0.80 02/20/2019 11:06 AM    BASOPHILS 0.60 02/20/2019 11:06 AM      Imaging     None    Assessment and Plan     Maurice Arana is a 33 y.o. male    1. HIV (human immunodeficiency virus infection) (HCC)  Asymptomatic, pending labs now and in 3m:    - CBC WITH DIFFERENTIAL; Future  - CD4/CD8 RATIO PROFILE; Future  - Wslwfdhcwx-Amebnyuj-Ibqcpcl AF (ODEFSEY) 200-25-25 MG Tab; Take 1 Tab by mouth every bedtime.  Dispense: 83 Tab; Refill: 0  - REFERRAL TO INFECTIOUS DISEASE  - CBC WITH DIFFERENTIAL; Future  - CD4/CD8 RATIO PROFILE; Future  - Comp Metabolic Panel; Future  - Comp Metabolic Panel; Future    2. RLS (restless legs syndrome)  Controlled, no meds    3. Bipolar 1 disorder (HCC)  No sx, no meds, will " be f/u.    4. Health care maintenance  Pending records    Counseling:   - Smoking:  Nonsmoker    Followup: Return in about 3 months (around 9/25/2019).    All questions are answered.    Please note that this dictation was created using voice recognition software, and that there might be errors of jocelyn and possibly content.

## 2019-06-27 LAB
ANNOTATION COMMENT IMP: NORMAL
CD3 CELLS # BLD: 1109 CELLS/UL (ref 570–2400)
CD3+CD4+ CELLS # BLD: 617 CELLS/UL (ref 430–1800)
CD3+CD4+ CELLS/CD3+CD8+ CLL BLD: 1.25 RATIO (ref 0.8–3.9)
CD3+CD8+ CELLS # BLD: 495 CELLS/UL (ref 210–1200)

## 2019-07-17 DIAGNOSIS — B20 HIV (HUMAN IMMUNODEFICIENCY VIRUS INFECTION) (HCC): ICD-10-CM

## 2019-09-08 ENCOUNTER — OFFICE VISIT (OUTPATIENT)
Dept: URGENT CARE | Facility: CLINIC | Age: 34
End: 2019-09-08
Payer: COMMERCIAL

## 2019-09-08 VITALS
SYSTOLIC BLOOD PRESSURE: 112 MMHG | RESPIRATION RATE: 16 BRPM | HEIGHT: 67 IN | WEIGHT: 189 LBS | BODY MASS INDEX: 29.66 KG/M2 | TEMPERATURE: 98.1 F | OXYGEN SATURATION: 98 % | DIASTOLIC BLOOD PRESSURE: 68 MMHG | HEART RATE: 60 BPM

## 2019-09-08 DIAGNOSIS — H10.31 ACUTE BACTERIAL CONJUNCTIVITIS OF RIGHT EYE: ICD-10-CM

## 2019-09-08 PROCEDURE — 99214 OFFICE O/P EST MOD 30 MIN: CPT | Performed by: NURSE PRACTITIONER

## 2019-09-08 RX ORDER — POLYMYXIN B SULFATE AND TRIMETHOPRIM 1; 10000 MG/ML; [USP'U]/ML
1 SOLUTION OPHTHALMIC EVERY 4 HOURS
Qty: 10 ML | Refills: 0 | Status: SHIPPED | OUTPATIENT
Start: 2019-09-08 | End: 2019-09-15

## 2019-09-08 NOTE — PROGRESS NOTES
Chief Complaint   Patient presents with   • [place] [facility]     x1 day, (R) eye tingling sensation, redness, crusty discharge, itchy       HISTORY OF PRESENT ILLNESS: Patient is a 33 y.o. male who presents to urgent care today with complaints of right eye irritation.  The patient notes that for the past 2 days he has had redness, drainage, crusting, itching to the right eye.  He denies any eye pain, photophobia, or visual changes.  He has tried OTC Visine for treatment.  He denies associated runny nose, fever, headache.  He does not wear contacts or glasses.    Patient Active Problem List    Diagnosis Date Noted   • HIV (human immunodeficiency virus infection) (Formerly [facility]) 02/02/2018   • Bipolar 1 disorder (Formerly [facility]) 02/02/2018   • RLS (restless legs syndrome) 02/02/2018   • Health care maintenance 02/02/2018       Allergies:Amoxicillin    Current Outpatient Medications Ordered in Epic   Medication Sig Dispense Refill   • polymixin-trimethoprim (POLYTRIM) 09572-4.1 UNIT/ML-% Solution Place 1 Drop in both eyes every 4 hours for 7 days. 10 mL 0   • Gzgcaumtnl-Ofnlbrkm-Rycbnty AF (ODEFSEY) 200-25-25 MG Tab Take 1 Tab by mouth every bedtime. 90 Tab 0     No current Epic-ordered facility-administered medications on file.        Past Medical History:   Diagnosis Date   • HIV (human immunodeficiency virus infection) (Formerly [facility])        Social History     Tobacco Use   • Smoking status: Never Smoker   • Smokeless tobacco: Never Used   Substance Use Topics   • Alcohol use: Yes     Alcohol/week: 1.2 oz     Types: 2 Cans of beer per week   • Drug use: No       Family Status   Relation Name Status   • Mo  (Not Specified)   • Fa  (Not Specified)   • Sis  (Not Specified)   • MGFa  (Not Specified)     Family History   Problem Relation Age of Onset   • Thyroid Mother    • Hypertension Father    • Blood Disease Sister         Sicle cell carrier   • Diabetes Maternal Grandfather        ROS:  Review of Systems   Constitutional: Negative for fever, chills,  "weight loss, malaise, and fatigue.   HENT: Negative for ear pain, nosebleeds, congestion, sore throat and neck pain.    Eyes: Positive for right eye redness, drainage, irritation, crusting, itching.  Negative for eye pain, photophobia, vision changes.   Neuro: Negative for headache, sensory changes, weakness, seizure, LOC.   Cardiovascular: Negative for chest pain, palpitations, orthopnea and leg swelling.   Respiratory: Negative for cough, sputum production, shortness of breath and wheezing.   Gastrointestinal: Negative for abdominal pain, nausea, vomiting or diarrhea.   Musculoskeletal: Negative for falls, neck pain, back pain, joint pain, myalgias.   Skin: Negative for rash, diaphoresis.     Exam:  /68   Pulse 60   Temp 36.7 °C (98.1 °F) (Temporal)   Resp 16   Ht 1.702 m (5' 7\")   Wt 85.7 kg (189 lb)   SpO2 98%   General: well-nourished, well-developed male in NAD  Head: normocephalic, atraumatic  Eyes: PERRLA, right-sided conjunctival injection, crusting noted, lids normal.  Ears: normal shape and symmetry, no tenderness, no discharge. External canals are without any significant edema or erythema. Tympanic membranes are without any inflammation, no effusion. Gross auditory acuity is intact.  Nose: symmetrical without tenderness, no discharge.  Mouth/Throat: reasonable hygiene, no erythema, exudates or tonsillar enlargement.  Neck: no masses, range of motion within normal limits, no tracheal deviation. No obvious thyroid enlargement.   Lymph: no cervical adenopathy. No supraclavicular adenopathy.   Neuro: alert and oriented. Cranial nerves 1-12 grossly intact. No sensory deficit.   Cardiovascular: regular rate and rhythm. No edema.  Pulmonary: no distress. Chest is symmetrical with respiration, no wheezes, crackles, or rhonchi.   Musculoskeletal: no clubbing, appropriate muscle tone, gait is stable.  Skin: warm, dry, intact, no clubbing, no cyanosis, no rashes.   Psych: appropriate mood, affect, " judgement.         Assessment/Plan:  1. Acute bacterial conjunctivitis of right eye  polymixin-trimethoprim (POLYTRIM) 10675-8.1 UNIT/ML-% Solution       Suspect conjunctivitis, Polytrim as directed.  If no improvement in 48 hours, instructed to seek care from an eye specialist.  Hand and eye hygiene addressed.  Supportive care, differential diagnoses, and indications for immediate follow-up discussed with patient.   Pathogenesis of diagnosis discussed including typical length and natural progression.   Instructed to return to clinic or nearest emergency department for any change in condition, further concerns, or worsening of symptoms.  Patient states understanding of the plan of care and discharge instructions.          Please note that this dictation was created using voice recognition software. I have made every reasonable attempt to correct obvious errors, but I expect that there are errors of grammar and possibly content that I did not discover before finalizing the note.      ALEJANDRA Lipscomb.

## 2019-09-24 ENCOUNTER — APPOINTMENT (OUTPATIENT)
Dept: MEDICAL GROUP | Facility: MEDICAL CENTER | Age: 34
End: 2019-09-24
Payer: COMMERCIAL

## 2020-02-16 ENCOUNTER — APPOINTMENT (OUTPATIENT)
Dept: RADIOLOGY | Facility: IMAGING CENTER | Age: 35
End: 2020-02-16
Attending: PHYSICIAN ASSISTANT
Payer: COMMERCIAL

## 2020-02-16 ENCOUNTER — OFFICE VISIT (OUTPATIENT)
Dept: URGENT CARE | Facility: CLINIC | Age: 35
End: 2020-02-16
Payer: COMMERCIAL

## 2020-02-16 VITALS
HEART RATE: 78 BPM | HEIGHT: 67 IN | BODY MASS INDEX: 29.82 KG/M2 | WEIGHT: 190 LBS | SYSTOLIC BLOOD PRESSURE: 122 MMHG | RESPIRATION RATE: 18 BRPM | OXYGEN SATURATION: 98 % | TEMPERATURE: 98.5 F | DIASTOLIC BLOOD PRESSURE: 80 MMHG

## 2020-02-16 DIAGNOSIS — S80.01XA TRAUMATIC HEMATOMA OF RIGHT KNEE, INITIAL ENCOUNTER: ICD-10-CM

## 2020-02-16 PROCEDURE — 73564 X-RAY EXAM KNEE 4 OR MORE: CPT | Mod: TC,FY,RT | Performed by: PHYSICIAN ASSISTANT

## 2020-02-16 PROCEDURE — 99214 OFFICE O/P EST MOD 30 MIN: CPT | Performed by: PHYSICIAN ASSISTANT

## 2020-02-16 ASSESSMENT — ENCOUNTER SYMPTOMS
BLURRED VISION: 0
NAUSEA: 0
SEIZURES: 0
DIARRHEA: 0
ORTHOPNEA: 0
TINGLING: 0
TREMORS: 0
LOSS OF CONSCIOUSNESS: 0
CLAUDICATION: 0
SENSORY CHANGE: 0
DIZZINESS: 0
CHILLS: 0
DOUBLE VISION: 0
VOMITING: 0
HEADACHES: 0
ABDOMINAL PAIN: 0
FOCAL WEAKNESS: 0
FEVER: 0
SHORTNESS OF BREATH: 0
PALPITATIONS: 0
WEAKNESS: 0
SPEECH CHANGE: 0
COUGH: 0

## 2020-02-16 NOTE — PATIENT INSTRUCTIONS
Hematoma  A hematoma is a collection of blood under the skin, in an organ, in a body space, in a joint space, or in other tissue. The blood can clot to form a lump that you can see and feel. The lump is often firm and may sometimes become sore and tender. Most hematomas get better in a few days to weeks. However, some hematomas may be serious and require medical care. Hematomas can range in size from very small to very large.  What are the causes?  A hematoma can be caused by a blunt or penetrating injury. It can also be caused by spontaneous leakage from a blood vessel under the skin. Spontaneous leakage from a blood vessel is more likely to occur in older people, especially those taking blood thinners. Sometimes, a hematoma can develop after certain medical procedures.  What are the signs or symptoms?  · A firm lump on the body.  · Possible pain and tenderness in the area.  · Bruising. Blue, dark blue, purple-red, or yellowish skin may appear at the site of the hematoma if the hematoma is close to the surface of the skin.  For hematomas in deeper tissues or body spaces, the signs and symptoms may be subtle. For example, an intra-abdominal hematoma may cause abdominal pain, weakness, fainting, and shortness of breath. An intracranial hematoma may cause a headache or symptoms such as weakness, trouble speaking, or a change in consciousness.  How is this diagnosed?  A hematoma can usually be diagnosed based on your medical history and a physical exam. Imaging tests may be needed if your health care provider suspects a hematoma in deeper tissues or body spaces, such as the abdomen, head, or chest. These tests may include ultrasonography or a CT scan.  How is this treated?  Hematomas usually go away on their own over time. Rarely does the blood need to be drained out of the body. Large hematomas or those that may affect vital organs will sometimes need surgical drainage or monitoring.  Follow these instructions at  home:  · Apply ice to the injured area:  ¨ Put ice in a plastic bag.  ¨ Place a towel between your skin and the bag.  ¨ Leave the ice on for 20 minutes, 2-3 times a day for the first 1 to 2 days.  · After the first 2 days, switch to using warm compresses on the hematoma.  · Elevate the injured area to help decrease pain and swelling. Wrapping the area with an elastic bandage may also be helpful. Compression helps to reduce swelling and promotes shrinking of the hematoma. Make sure the bandage is not wrapped too tight.  · If your hematoma is on a lower extremity and is painful, crutches may be helpful for a couple days.  · Only take over-the-counter or prescription medicines as directed by your health care provider.  Get help right away if:  · You have increasing pain, or your pain is not controlled with medicine.  · You have a fever.  · You have worsening swelling or discoloration.  · Your skin over the hematoma breaks or starts bleeding.  · Your hematoma is in your chest or abdomen and you have weakness, shortness of breath, or a change in consciousness.  · Your hematoma is on your scalp (caused by a fall or injury) and you have a worsening headache or a change in alertness or consciousness.  This information is not intended to replace advice given to you by your health care provider. Make sure you discuss any questions you have with your health care provider.  Document Released: 08/01/2005 Document Revised: 05/25/2017 Document Reviewed: 05/28/2014  [employer] Patient Education © 2017 [employer]

## 2020-02-16 NOTE — PROGRESS NOTES
Subjective:      Maurice Arana is a 34 y.o. male who presents with Knee Injury (Week ago hit right knee, bruise and sore, yesterday hit left knee)            Knee Injury   This is a new problem. The current episode started in the past 7 days (hit in leg with soft ball). The problem occurs constantly. The problem has been unchanged. Pertinent negatives include no abdominal pain, chest pain, chills, coughing, fever, headaches, nausea, rash, vomiting or weakness. Nothing aggravates the symptoms. He has tried nothing for the symptoms.       Review of Systems   Constitutional: Negative for chills and fever.   Eyes: Negative for blurred vision and double vision.   Respiratory: Negative for cough and shortness of breath.    Cardiovascular: Negative for chest pain, palpitations, orthopnea, claudication and leg swelling.   Gastrointestinal: Negative for abdominal pain, diarrhea, nausea and vomiting.   Musculoskeletal:        Right knee pain   Skin: Negative for rash.   Neurological: Negative for dizziness, tingling, tremors, sensory change, speech change, focal weakness, seizures, loss of consciousness, weakness and headaches.   All other systems reviewed and are negative.    PMH:  has a past medical history of HIV (human immunodeficiency virus infection) ([facility]).  MEDS:   Current Outpatient Medications:   •  Sdzaycrtyp-Yalvhakp-Usetiyn AF (ODEFSEY) 200-25-25 MG Tab, Take 1 Tab by mouth every bedtime., Disp: 90 Tab, Rfl: 0  ALLERGIES:   Allergies   Allergen Reactions   • Amoxicillin Rash     Around the neck     SURGHX: History reviewed. No pertinent surgical history.  SOCHX:  reports that he has never smoked. He has never used smokeless tobacco. He reports current alcohol use of about 1.2 oz of alcohol per week. He reports that he does not use drugs.  FH: Family history was reviewed, no pertinent findings to report  Medications, Allergies, and current problem list reviewed today in Epic         Objective:     Blood  "Pressure 122/80   Pulse 78   Temperature 36.9 °C (98.5 °F)   Respiration 18   Height 1.702 m (5' 7\")   Weight 86.2 kg (190 lb)   Oxygen Saturation 98%   Body Mass Index 29.76 kg/m²      Physical Exam  Vitals signs reviewed.   Constitutional:       Appearance: He is well-developed.   Neck:      Musculoskeletal: Normal range of motion and neck supple.   Cardiovascular:      Rate and Rhythm: Normal rate and regular rhythm.      Pulses: Normal pulses.           Popliteal pulses are 2+ on the right side and 2+ on the left side.        Dorsalis pedis pulses are 2+ on the right side and 2+ on the left side.      Heart sounds: Normal heart sounds.   Pulmonary:      Effort: Pulmonary effort is normal.      Breath sounds: Normal breath sounds.   Musculoskeletal:         General: Tenderness present. No swelling or deformity.      Comments: PTP of the medial aspect of the right knee.  Bruising/hematoma present.  Limited ROM with active movement.  No pain with passive movement.  Pt able to squat.  No palpable effusion/bursa.  Patella is midline. Extremity compartments soft.      Special Tests:   Varus/Valgus: NEG  Lachman: NEG  Posterior Drawer: NEG   Olga: NEG   Skin:     General: Skin is warm and dry.      Capillary Refill: Capillary refill takes less than 2 seconds.      Findings: No erythema.   Neurological:      General: No focal deficit present.      Mental Status: He is alert and oriented to person, place, and time. Mental status is at baseline.      Sensory: No sensory deficit.      Gait: Gait abnormal.      Comments: Antalgic gait present.   Psychiatric:         Mood and Affect: Mood normal.         Behavior: Behavior normal.         Thought Content: Thought content normal.         Judgment: Judgment normal.            2/16/2020 12:22 PM     HISTORY/REASON FOR EXAM:  Pain/Deformity Following Trauma.        TECHNIQUE/EXAM DESCRIPTION AND NUMBER OF VIEWS:  4 views of the RIGHT knee.     COMPARISON: " None     FINDINGS:  There is no fracture or dislocation.  The visualized osseous structures are in anatomic alignment.  Mild degenerative changes with small marginal osteophytes. Mild medial compartment joint space narrowing.  Bone mineralization is age-appropriate..  No significant joint effusion.     IMPRESSION:     No acute osseous abnormality.     Assessment/Plan:       1. Traumatic hematoma of right knee, initial encounter  - Warm compresses/wrap  - DX-KNEE COMPLETE 4+ RIGHT; Future    Differential diagnosis, natural history, supportive care discussed. Follow-up with primary care provider within 7-10 days, emergency room precautions discussed.  Patient and/or family appears understanding of information.  Handout and review of patients diagnosis and treatment was discussed extensively.

## 2020-03-03 ENCOUNTER — OFFICE VISIT (OUTPATIENT)
Dept: URGENT CARE | Facility: CLINIC | Age: 35
End: 2020-03-03
Payer: COMMERCIAL

## 2020-03-03 VITALS
WEIGHT: 190 LBS | TEMPERATURE: 98.1 F | BODY MASS INDEX: 29.82 KG/M2 | HEIGHT: 67 IN | RESPIRATION RATE: 18 BRPM | DIASTOLIC BLOOD PRESSURE: 80 MMHG | SYSTOLIC BLOOD PRESSURE: 122 MMHG | HEART RATE: 72 BPM | OXYGEN SATURATION: 96 %

## 2020-03-03 DIAGNOSIS — L03.211 CELLULITIS OF CHIN: ICD-10-CM

## 2020-03-03 PROCEDURE — 99203 OFFICE O/P NEW LOW 30 MIN: CPT | Performed by: FAMILY MEDICINE

## 2020-03-03 RX ORDER — SULFAMETHOXAZOLE AND TRIMETHOPRIM 800; 160 MG/1; MG/1
1 TABLET ORAL 2 TIMES DAILY
Qty: 14 TAB | Refills: 0 | Status: SHIPPED | OUTPATIENT
Start: 2020-03-03 | End: 2020-03-10

## 2020-03-03 RX ORDER — CLINDAMYCIN HYDROCHLORIDE 300 MG/1
300 CAPSULE ORAL 4 TIMES DAILY
Qty: 28 CAP | Refills: 0 | Status: SHIPPED | OUTPATIENT
Start: 2020-03-03 | End: 2020-03-10

## 2020-03-03 ASSESSMENT — ENCOUNTER SYMPTOMS
NAUSEA: 0
DIZZINESS: 0
FEVER: 0
VOMITING: 0
EYE REDNESS: 0
CHILLS: 0
SHORTNESS OF BREATH: 0
SORE THROAT: 0
EDEMA: 1
MYALGIAS: 0

## 2020-03-03 ASSESSMENT — FIBROSIS 4 INDEX: FIB4 SCORE: 0.99

## 2020-03-03 NOTE — PROGRESS NOTES
Subjective:   Maurice Arana is a 34 y.o. male who presents for Edema (Today swollen lump under chin .)        34-year-old male presents to urgent care with a chief complaint of swelling under the chin.  The patient woke this morning and noted swelling under his chin.  The patient denies specific pain, denies injury to the face or chin other than the patient suggested possibly scratched under the chin when feeling a papule.  Patient denies difficulty opening the jaw, denies sore throat, denies pain under the tongue, denies fevers chills or sweats.  Patient states he had a similar infection in the face which was an MRSA infection and required surgical drainage.  The patient reports history of HIV states he is been undetectable for over 5 years, CD4 count was over 600.    Edema   Pertinent negatives include no chest pain, chills, fever, myalgias, nausea, rash, sore throat or vomiting. He has tried nothing for the symptoms.     PMH:  has a past medical history of HIV (human immunodeficiency virus infection) ([facility]).  MEDS:   Current Outpatient Medications:   •  sulfamethoxazole-trimethoprim (BACTRIM DS) 800-160 MG tablet, Take 1 Tab by mouth 2 times a day for 7 days., Disp: 14 Tab, Rfl: 0  •  clindamycin (CLEOCIN) 300 MG Cap, Take 1 Cap by mouth 4 times a day for 7 days., Disp: 28 Cap, Rfl: 0  •  Ipesbicptc-Eqzauieb-Uycmano AF (ODEFSEY) 200-25-25 MG Tab, Take 1 Tab by mouth every bedtime., Disp: 90 Tab, Rfl: 0  ALLERGIES:   Allergies   Allergen Reactions   • Amoxicillin Rash     Around the neck     SURGHX: No past surgical history on file.  SOCHX:  reports that he has never smoked. He has never used smokeless tobacco. He reports current alcohol use of about 1.2 oz of alcohol per week. He reports that he does not use drugs.  FH: Family history was reviewed  Review of Systems   Constitutional: Negative for chills and fever.   HENT: Negative for sore throat.    Eyes: Negative for redness.   Respiratory: Negative for  "shortness of breath.    Cardiovascular: Negative for chest pain.   Gastrointestinal: Negative for nausea and vomiting.   Genitourinary: Negative for dysuria.   Musculoskeletal: Negative for myalgias.   Skin: Negative for rash.   Neurological: Negative for dizziness.        Objective:   /80   Pulse 72   Temp 36.7 °C (98.1 °F) (Temporal)   Resp 18   Ht 1.702 m (5' 7\")   Wt 86.2 kg (190 lb)   SpO2 96%   BMI 29.76 kg/m²   Physical Exam  Vitals signs and nursing note reviewed.   Constitutional:       General: He is not in acute distress.     Appearance: He is well-developed.   HENT:      Head: Normocephalic and atraumatic.      Jaw: No trismus, tenderness or pain on movement.      Salivary Glands: Right salivary gland is not diffusely enlarged or tender. Left salivary gland is not diffusely enlarged or tender.        Right Ear: External ear normal. Tympanic membrane is erythematous and bulging.      Left Ear: External ear normal. Tympanic membrane is erythematous and bulging.      Nose: Nose normal.      Mouth/Throat:      Mouth: Mucous membranes are moist.      Dentition: No dental abscesses.      Comments: Sublingual space soft, nontender  Eyes:      Conjunctiva/sclera: Conjunctivae normal.      Pupils: Pupils are equal, round, and reactive to light.   Cardiovascular:      Rate and Rhythm: Normal rate and regular rhythm.      Heart sounds: No murmur.   Pulmonary:      Effort: Pulmonary effort is normal. No respiratory distress.      Breath sounds: Normal breath sounds.   Abdominal:      General: There is no distension.      Palpations: Abdomen is soft.      Tenderness: There is no abdominal tenderness.   Musculoskeletal: Normal range of motion.   Skin:     General: Skin is warm and dry.   Neurological:      General: No focal deficit present.      Mental Status: He is alert and oriented to person, place, and time. Mental status is at baseline.      Gait: Gait (gait at baseline) normal.   Psychiatric:        "  Judgment: Judgment normal.     Medical decision making: Patient presents with cellulitis submental aspect of the face with no fluctuant abscess palpated.  In the context of the previous history of an abscess which required surgical intervention and inpatient admission, and with clinical concern for potential MRSA, clindamycin 300 mg 4 times daily and Bactrim double strength twice daily was initiated.  The patient was given worsening precautions to go directly to the emergency department for any persistent or worsening symptoms including pain, fever, increased redness, mass under the chin, difficulty opening the jaw, or any other concerns.  The patient was advised of the potential necessity for surgical and/or inpatient management yet at this time that the patient requested a trial of outpatient management.            Assessment/Plan:   1. Cellulitis of chin  - sulfamethoxazole-trimethoprim (BACTRIM DS) 800-160 MG tablet; Take 1 Tab by mouth 2 times a day for 7 days.  Dispense: 14 Tab; Refill: 0  - clindamycin (CLEOCIN) 300 MG Cap; Take 1 Cap by mouth 4 times a day for 7 days.  Dispense: 28 Cap; Refill: 0      Discussed close monitoring, return precautions, and supportive measures including maintaining adequate fluid hydration and caloric intake, relative rest and OTC symptom management including acetaminophen as needed for pain and/or fever.    Differential diagnosis, natural history, supportive care, and indications for immediate follow-up discussed.     Advised the patient to follow-up with the primary care physician for recheck, reevaluation, and consideration of further management.

## 2020-03-03 NOTE — LETTER
March 3, 2020         Patient: Maurice Arana   YOB: 1985   Date of Visit: 3/3/2020           To Whom it May Concern:    Maurice Arnaa was seen in my clinic on 3/3/2020. He may return to work on 3/4/2020..    If you have any questions or concerns, please don't hesitate to call.        Sincerely,           [facility]  Electronically Signed

## 2020-05-11 ENCOUNTER — OFFICE VISIT (OUTPATIENT)
Dept: URGENT CARE | Facility: CLINIC | Age: 35
End: 2020-05-11
Payer: COMMERCIAL

## 2020-05-11 VITALS
TEMPERATURE: 98.5 F | OXYGEN SATURATION: 95 % | SYSTOLIC BLOOD PRESSURE: 128 MMHG | DIASTOLIC BLOOD PRESSURE: 80 MMHG | RESPIRATION RATE: 20 BRPM | WEIGHT: 192 LBS | HEART RATE: 78 BPM | BODY MASS INDEX: 30.13 KG/M2 | HEIGHT: 67 IN

## 2020-05-11 DIAGNOSIS — M62.830 BACK SPASM: ICD-10-CM

## 2020-05-11 PROCEDURE — 99214 OFFICE O/P EST MOD 30 MIN: CPT | Mod: 25 | Performed by: PHYSICIAN ASSISTANT

## 2020-05-11 RX ORDER — CYCLOBENZAPRINE HCL 10 MG
10 TABLET ORAL
Qty: 15 TAB | Refills: 0 | Status: SHIPPED | OUTPATIENT
Start: 2020-05-11 | End: 2022-10-27

## 2020-05-11 RX ORDER — KETOROLAC TROMETHAMINE 30 MG/ML
30 INJECTION, SOLUTION INTRAMUSCULAR; INTRAVENOUS ONCE
Status: COMPLETED | OUTPATIENT
Start: 2020-05-11 | End: 2020-05-11

## 2020-05-11 RX ADMIN — KETOROLAC TROMETHAMINE 30 MG: 30 INJECTION, SOLUTION INTRAMUSCULAR; INTRAVENOUS at 15:05

## 2020-05-11 ASSESSMENT — ENCOUNTER SYMPTOMS
BOWEL INCONTINENCE: 0
PALPITATIONS: 0
BACK PAIN: 1
ABDOMINAL PAIN: 0

## 2020-05-11 ASSESSMENT — FIBROSIS 4 INDEX: FIB4 SCORE: 0.99

## 2020-05-11 NOTE — PROGRESS NOTES
Subjective:   Maurice Arana is a 34 y.o. male who presents today with   Chief Complaint   Patient presents with   • Back Pain     Couple days ago middle back pain .       Back Pain   This is a new problem. Episode onset: 2 days. The problem occurs constantly. The problem is unchanged. The pain is present in the thoracic spine. The quality of the pain is described as aching. The pain does not radiate. The pain is mild. The symptoms are aggravated by position. Pertinent negatives include no abdominal pain, bladder incontinence, bowel incontinence or chest pain. He has tried NSAIDs for the symptoms. The treatment provided mild relief.   Patient states he woke up this weekend and had back spasms similar to what he is experienced in the past.  Patient denies any specific injury or trauma to the area.    PMH:  has a past medical history of HIV (human immunodeficiency virus infection) ([facility]).  MEDS:   Current Outpatient Medications:   •  cyclobenzaprine (FLEXERIL) 10 MG Tab, Take 1 Tab by mouth at bedtime as needed., Disp: 15 Tab, Rfl: 0  •  Dtytmvczet-Cfvzpufh-Nxldesd AF (ODEFSEY) 200-25-25 MG Tab, Take 1 Tab by mouth every bedtime., Disp: 90 Tab, Rfl: 0  ALLERGIES:   Allergies   Allergen Reactions   • Amoxicillin Rash     Around the neck     SURGHX: No past surgical history on file.  SOCHX:  reports that he has never smoked. He has never used smokeless tobacco. He reports current alcohol use of about 1.2 oz of alcohol per week. He reports that he does not use drugs.  FH: Reviewed with patient, not pertinent to this visit.       Review of Systems   Cardiovascular: Negative for chest pain and palpitations.   Gastrointestinal: Negative for abdominal pain and bowel incontinence.   Genitourinary: Negative for bladder incontinence.   Musculoskeletal: Positive for back pain.   All other systems reviewed and are negative.       Objective:   /80   Pulse 78   Temp 36.9 °C (98.5 °F) (Temporal)   Resp 20   Ht 1.702  "m (5' 7\")   Wt 87.1 kg (192 lb)   SpO2 95%   BMI 30.07 kg/m²   Physical Exam  Vitals signs and nursing note reviewed.   Constitutional:       General: He is not in acute distress.     Appearance: He is well-developed.   HENT:      Head: Normocephalic and atraumatic.      Right Ear: Hearing normal.      Left Ear: Hearing normal.   Eyes:      Pupils: Pupils are equal, round, and reactive to light.   Cardiovascular:      Rate and Rhythm: Normal rate and regular rhythm.      Heart sounds: Normal heart sounds.   Pulmonary:      Effort: Pulmonary effort is normal.   Musculoskeletal:        Back:       Comments: Tenderness with deep palpation to left thoracic muscles. Slightly limited ROM of left shoulder above the head and posteriorly secondary to pain.  Normal strength, neurovascularly intact.    Skin:     General: Skin is warm and dry.   Neurological:      Mental Status: He is alert.      Coordination: Coordination normal.   Psychiatric:         Mood and Affect: Mood normal.       Patient tolerated Toradol in clinic today.     Assessment/Plan:   Assessment    1. Back spasm  - ketorolac (TORADOL) injection 30 mg  - cyclobenzaprine (FLEXERIL) 10 MG Tab; Take 1 Tab by mouth at bedtime as needed.  Dispense: 15 Tab; Refill: 0  Discussed with patient potential drowsiness side effects of muscle relaxer and he will only take sparingly at night as needed not for driving or working.  Differential diagnosis, natural history, supportive care, and indications for immediate follow-up discussed.   Patient given instructions and understanding of medications and treatment.    If not improving in 3-5 days, F/U with PCP or return to  if symptoms worsen.    Patient agreeable to plan.      Please note that this dictation was created using voice recognition software. I have made every reasonable attempt to correct obvious errors, but I expect that there are errors of grammar and possibly content that I did not discover before finalizing " the note.    Jef Layne PA-C

## 2022-01-18 ENCOUNTER — OFFICE VISIT (OUTPATIENT)
Dept: URGENT CARE | Facility: CLINIC | Age: 37
End: 2022-01-18
Payer: COMMERCIAL

## 2022-01-18 VITALS
OXYGEN SATURATION: 95 % | HEIGHT: 67 IN | TEMPERATURE: 97.3 F | DIASTOLIC BLOOD PRESSURE: 74 MMHG | RESPIRATION RATE: 16 BRPM | SYSTOLIC BLOOD PRESSURE: 132 MMHG | WEIGHT: 217.4 LBS | BODY MASS INDEX: 34.12 KG/M2 | HEART RATE: 66 BPM

## 2022-01-18 DIAGNOSIS — H01.001 BLEPHARITIS OF RIGHT UPPER EYELID, UNSPECIFIED TYPE: ICD-10-CM

## 2022-01-18 DIAGNOSIS — H00.011 HORDEOLUM EXTERNUM OF RIGHT UPPER EYELID: ICD-10-CM

## 2022-01-18 PROCEDURE — 99213 OFFICE O/P EST LOW 20 MIN: CPT | Performed by: NURSE PRACTITIONER

## 2022-01-18 RX ORDER — POLYMYXIN B SULFATE AND TRIMETHOPRIM 1; 10000 MG/ML; [USP'U]/ML
1 SOLUTION OPHTHALMIC EVERY 4 HOURS
Qty: 10 ML | Refills: 0 | Status: SHIPPED | OUTPATIENT
Start: 2022-01-18 | End: 2022-01-25

## 2022-01-18 ASSESSMENT — ENCOUNTER SYMPTOMS
EYE PAIN: 1
CONSTITUTIONAL NEGATIVE: 1
BLURRED VISION: 0
EYE DISCHARGE: 0
EYE REDNESS: 0

## 2022-01-18 ASSESSMENT — VISUAL ACUITY: OU: 1

## 2022-01-18 NOTE — PROGRESS NOTES
Subjective:     Maurice Arana is a 36 y.o. male who presents for Stye (Possible stye in the upper lid on the right eye x 4 days)       Eye Problem   The right eye is affected. This is a new problem. The problem has been gradually worsening. Pertinent negatives include no blurred vision, eye discharge or eye redness.     Patient reporting pain, tenderness, and swelling at the right upper eyelid.  Denies discharge, blurry vision, or eye redness.    Patient was screened prior to rooming and denied COVID-19 diagnosis or contact with a person who has been diagnosed or is suspected to have COVID-19. During this visit, appropriate PPE was worn, hand hygiene was performed, and the patient and any visitors were masked.     PMH:  has a past medical history of HIV (human immunodeficiency virus infection) ([facility]).    MEDS:   Current Outpatient Medications:   •  polymixin-trimethoprim (POLYTRIM) 27745-5.1 UNIT/ML-% Solution, Administer 1 Drop into the right eye every 4 hours for 7 days., Disp: 10 mL, Rfl: 0  •  cyclobenzaprine (FLEXERIL) 10 MG Tab, Take 1 Tab by mouth at bedtime as needed., Disp: 15 Tab, Rfl: 0  •  Vzhvoewyay-Gybgezlv-Crsxkwa AF (ODEFSEY) 200-25-25 MG Tab, Take 1 Tab by mouth every bedtime., Disp: 90 Tab, Rfl: 0    ALLERGIES:   Allergies   Allergen Reactions   • Amoxicillin Rash     Around the neck     SURGHX: History reviewed. No pertinent surgical history.    SOCHX:  reports that he has never smoked. He has never used smokeless tobacco. He reports current alcohol use of about 1.2 oz of alcohol per week. He reports that he does not use drugs.     FH: Reviewed with patient, not pertinent to this visit.    Review of Systems   Constitutional: Negative.    Eyes: Positive for pain. Negative for blurred vision, discharge and redness.   All other systems reviewed and are negative.    Additional details per HPI.      Objective:     /74 (BP Location: Left arm, Patient Position: Sitting, BP Cuff Size: Adult)    "Pulse 66   Temp 36.3 °C (97.3 °F) (Temporal)   Resp 16   Ht 1.702 m (5' 7\")   Wt 98.6 kg (217 lb 6.4 oz)   SpO2 95%   BMI 34.05 kg/m²     Physical Exam  Vitals reviewed.   Constitutional:       General: He is not in acute distress.     Appearance: He is well-developed. He is not ill-appearing or toxic-appearing.   Eyes:      General: Vision grossly intact.         Right eye: Hordeolum (Right upper eyelid) present. No discharge.      Extraocular Movements: Extraocular movements intact.      Conjunctiva/sclera: Conjunctivae normal.      Right eye: Right conjunctiva is not injected. No chemosis.     Pupils: Pupils are equal, round, and reactive to light.      Comments: Erythema, swelling, tenderness of right upper eyelid   Cardiovascular:      Rate and Rhythm: Normal rate.   Pulmonary:      Effort: Pulmonary effort is normal. No respiratory distress.   Musculoskeletal:         General: No deformity. Normal range of motion.      Cervical back: Normal range of motion.   Skin:     General: Skin is warm and dry.      Coloration: Skin is not pale.   Neurological:      Mental Status: He is alert and oriented to person, place, and time.      Sensory: No sensory deficit.      Motor: No weakness.      Coordination: Coordination normal.   Psychiatric:         Behavior: Behavior normal. Behavior is cooperative.       Assessment/Plan:     1. Hordeolum externum of right upper eyelid  - polymixin-trimethoprim (POLYTRIM) 84208-0.1 UNIT/ML-% Solution; Administer 1 Drop into the right eye every 4 hours for 7 days.  Dispense: 10 mL; Refill: 0    2. Blepharitis of right upper eyelid, unspecified type  - polymixin-trimethoprim (POLYTRIM) 22760-1.1 UNIT/ML-% Solution; Administer 1 Drop into the right eye every 4 hours for 7 days.  Dispense: 10 mL; Refill: 0    Rx as above sent electronically.     Differential diagnosis, natural history, supportive care, warm compresses, over-the-counter symptom management per 's " instructions, close monitoring, and indications for immediate follow-up discussed.     All questions answered. Patient agrees with the plan of care.

## 2022-10-27 ENCOUNTER — PRE-ADMISSION TESTING (OUTPATIENT)
Dept: ADMISSIONS | Facility: MEDICAL CENTER | Age: 37
End: 2022-10-27
Attending: SURGERY
Payer: COMMERCIAL

## 2022-10-28 ENCOUNTER — HOSPITAL ENCOUNTER (OUTPATIENT)
Facility: MEDICAL CENTER | Age: 37
End: 2022-10-28
Attending: SURGERY | Admitting: SURGERY
Payer: COMMERCIAL

## 2022-10-28 ENCOUNTER — ANESTHESIA (OUTPATIENT)
Dept: SURGERY | Facility: MEDICAL CENTER | Age: 37
End: 2022-10-28
Payer: COMMERCIAL

## 2022-10-28 ENCOUNTER — ANESTHESIA EVENT (OUTPATIENT)
Dept: SURGERY | Facility: MEDICAL CENTER | Age: 37
End: 2022-10-28
Payer: COMMERCIAL

## 2022-10-28 VITALS
RESPIRATION RATE: 16 BRPM | HEIGHT: 67 IN | WEIGHT: 209 LBS | HEART RATE: 67 BPM | OXYGEN SATURATION: 93 % | BODY MASS INDEX: 32.8 KG/M2 | TEMPERATURE: 97.5 F | SYSTOLIC BLOOD PRESSURE: 119 MMHG | DIASTOLIC BLOOD PRESSURE: 56 MMHG

## 2022-10-28 PROBLEM — R85.612 LGSIL PAP SMEAR OF ANUS: Chronic | Status: ACTIVE | Noted: 2022-10-28

## 2022-10-28 LAB — PATHOLOGY CONSULT NOTE: NORMAL

## 2022-10-28 PROCEDURE — 700101 HCHG RX REV CODE 250: Performed by: ANESTHESIOLOGY

## 2022-10-28 PROCEDURE — 700111 HCHG RX REV CODE 636 W/ 250 OVERRIDE (IP): Performed by: ANESTHESIOLOGY

## 2022-10-28 PROCEDURE — 160002 HCHG RECOVERY MINUTES (STAT): Performed by: SURGERY

## 2022-10-28 PROCEDURE — 160025 RECOVERY II MINUTES (STATS): Performed by: SURGERY

## 2022-10-28 PROCEDURE — 00811 ANES LWR INTST NDSC NOS: CPT | Performed by: ANESTHESIOLOGY

## 2022-10-28 PROCEDURE — 88305 TISSUE EXAM BY PATHOLOGIST: CPT

## 2022-10-28 PROCEDURE — 88342 IMHCHEM/IMCYTCHM 1ST ANTB: CPT

## 2022-10-28 PROCEDURE — A9270 NON-COVERED ITEM OR SERVICE: HCPCS | Performed by: ANESTHESIOLOGY

## 2022-10-28 PROCEDURE — 160035 HCHG PACU - 1ST 60 MINS PHASE I: Performed by: SURGERY

## 2022-10-28 PROCEDURE — 160048 HCHG OR STATISTICAL LEVEL 1-5: Performed by: SURGERY

## 2022-10-28 PROCEDURE — 700101 HCHG RX REV CODE 250: Performed by: SURGERY

## 2022-10-28 PROCEDURE — 160009 HCHG ANES TIME/MIN: Performed by: SURGERY

## 2022-10-28 PROCEDURE — 160046 HCHG PACU - 1ST 60 MINS PHASE II: Performed by: SURGERY

## 2022-10-28 PROCEDURE — 160027 HCHG SURGERY MINUTES - 1ST 30 MINS LEVEL 2: Performed by: SURGERY

## 2022-10-28 PROCEDURE — 700105 HCHG RX REV CODE 258: Performed by: SURGERY

## 2022-10-28 PROCEDURE — 160038 HCHG SURGERY MINUTES - EA ADDL 1 MIN LEVEL 2: Performed by: SURGERY

## 2022-10-28 PROCEDURE — 700102 HCHG RX REV CODE 250 W/ 637 OVERRIDE(OP): Performed by: ANESTHESIOLOGY

## 2022-10-28 RX ORDER — ONDANSETRON 2 MG/ML
4 INJECTION INTRAMUSCULAR; INTRAVENOUS
Status: DISCONTINUED | OUTPATIENT
Start: 2022-10-28 | End: 2022-10-28 | Stop reason: HOSPADM

## 2022-10-28 RX ORDER — SODIUM CHLORIDE, SODIUM LACTATE, POTASSIUM CHLORIDE, CALCIUM CHLORIDE 600; 310; 30; 20 MG/100ML; MG/100ML; MG/100ML; MG/100ML
INJECTION, SOLUTION INTRAVENOUS CONTINUOUS
Status: ACTIVE | OUTPATIENT
Start: 2022-10-28 | End: 2022-10-28

## 2022-10-28 RX ORDER — LIDOCAINE HYDROCHLORIDE 20 MG/ML
INJECTION, SOLUTION EPIDURAL; INFILTRATION; INTRACAUDAL; PERINEURAL PRN
Status: DISCONTINUED | OUTPATIENT
Start: 2022-10-28 | End: 2022-10-28 | Stop reason: SURG

## 2022-10-28 RX ORDER — OXYCODONE HCL 5 MG/5 ML
5 SOLUTION, ORAL ORAL
Status: COMPLETED | OUTPATIENT
Start: 2022-10-28 | End: 2022-10-28

## 2022-10-28 RX ORDER — OXYCODONE HCL 5 MG/5 ML
10 SOLUTION, ORAL ORAL
Status: COMPLETED | OUTPATIENT
Start: 2022-10-28 | End: 2022-10-28

## 2022-10-28 RX ORDER — ACETIC ACID 3 %
LIQUID (ML) MISCELLANEOUS
Status: DISCONTINUED | OUTPATIENT
Start: 2022-10-28 | End: 2022-10-28 | Stop reason: HOSPADM

## 2022-10-28 RX ORDER — BUPIVACAINE HYDROCHLORIDE AND EPINEPHRINE 5; 5 MG/ML; UG/ML
INJECTION, SOLUTION EPIDURAL; INTRACAUDAL; PERINEURAL
Status: DISCONTINUED | OUTPATIENT
Start: 2022-10-28 | End: 2022-10-28 | Stop reason: HOSPADM

## 2022-10-28 RX ORDER — HALOPERIDOL 5 MG/ML
1 INJECTION INTRAMUSCULAR
Status: DISCONTINUED | OUTPATIENT
Start: 2022-10-28 | End: 2022-10-28 | Stop reason: HOSPADM

## 2022-10-28 RX ORDER — DIPHENHYDRAMINE HYDROCHLORIDE 50 MG/ML
12.5 INJECTION INTRAMUSCULAR; INTRAVENOUS
Status: DISCONTINUED | OUTPATIENT
Start: 2022-10-28 | End: 2022-10-28 | Stop reason: HOSPADM

## 2022-10-28 RX ORDER — HYDROMORPHONE HYDROCHLORIDE 1 MG/ML
0.4 INJECTION, SOLUTION INTRAMUSCULAR; INTRAVENOUS; SUBCUTANEOUS
Status: DISCONTINUED | OUTPATIENT
Start: 2022-10-28 | End: 2022-10-28 | Stop reason: HOSPADM

## 2022-10-28 RX ORDER — HYDROMORPHONE HYDROCHLORIDE 1 MG/ML
0.1 INJECTION, SOLUTION INTRAMUSCULAR; INTRAVENOUS; SUBCUTANEOUS
Status: DISCONTINUED | OUTPATIENT
Start: 2022-10-28 | End: 2022-10-28 | Stop reason: HOSPADM

## 2022-10-28 RX ORDER — MEPERIDINE HYDROCHLORIDE 25 MG/ML
6.25 INJECTION INTRAMUSCULAR; INTRAVENOUS; SUBCUTANEOUS
Status: DISCONTINUED | OUTPATIENT
Start: 2022-10-28 | End: 2022-10-28 | Stop reason: HOSPADM

## 2022-10-28 RX ORDER — HYDROMORPHONE HYDROCHLORIDE 1 MG/ML
0.2 INJECTION, SOLUTION INTRAMUSCULAR; INTRAVENOUS; SUBCUTANEOUS
Status: DISCONTINUED | OUTPATIENT
Start: 2022-10-28 | End: 2022-10-28 | Stop reason: HOSPADM

## 2022-10-28 RX ORDER — SODIUM CHLORIDE, SODIUM LACTATE, POTASSIUM CHLORIDE, CALCIUM CHLORIDE 600; 310; 30; 20 MG/100ML; MG/100ML; MG/100ML; MG/100ML
INJECTION, SOLUTION INTRAVENOUS CONTINUOUS
Status: DISCONTINUED | OUTPATIENT
Start: 2022-10-28 | End: 2022-10-28 | Stop reason: HOSPADM

## 2022-10-28 RX ADMIN — FENTANYL CITRATE 25 MCG: 50 INJECTION, SOLUTION INTRAMUSCULAR; INTRAVENOUS at 16:15

## 2022-10-28 RX ADMIN — Medication 30 MG: at 15:28

## 2022-10-28 RX ADMIN — SODIUM CHLORIDE, POTASSIUM CHLORIDE, SODIUM LACTATE AND CALCIUM CHLORIDE: 600; 310; 30; 20 INJECTION, SOLUTION INTRAVENOUS at 12:08

## 2022-10-28 RX ADMIN — FENTANYL CITRATE 25 MCG: 50 INJECTION, SOLUTION INTRAMUSCULAR; INTRAVENOUS at 16:20

## 2022-10-28 RX ADMIN — SUGAMMADEX 200 MG: 100 INJECTION, SOLUTION INTRAVENOUS at 16:04

## 2022-10-28 RX ADMIN — PROPOFOL 200 MG: 10 INJECTION, EMULSION INTRAVENOUS at 15:28

## 2022-10-28 RX ADMIN — ROCURONIUM BROMIDE 30 MG: 10 INJECTION, SOLUTION INTRAVENOUS at 15:28

## 2022-10-28 RX ADMIN — MIDAZOLAM 2 MG: 1 INJECTION INTRAMUSCULAR; INTRAVENOUS at 15:08

## 2022-10-28 RX ADMIN — FENTANYL CITRATE 50 MCG: 50 INJECTION, SOLUTION INTRAMUSCULAR; INTRAVENOUS at 16:12

## 2022-10-28 RX ADMIN — OXYCODONE HYDROCHLORIDE 10 MG: 5 SOLUTION ORAL at 16:11

## 2022-10-28 RX ADMIN — LIDOCAINE HYDROCHLORIDE 100 MG: 20 INJECTION, SOLUTION EPIDURAL; INFILTRATION; INTRACAUDAL at 15:28

## 2022-10-28 ASSESSMENT — PAIN DESCRIPTION - PAIN TYPE
TYPE: SURGICAL PAIN

## 2022-10-28 NOTE — H&P
Surgery General History & Physical Note    Date  10/28/2022    Primary Care Physician  JORGE Montoya.    CC  Presents for elective examination under anesthesia with high-resolution anoscopy    HPI  This is a 37 y.o. male who presented with HIV who underwent anal Pap smear demonstrating low-grade squamous intraepithelial lesion is here today for definitive surgical diagnosis and treatment.  Since his clinic visit he denies any new medications, new physicians or emergency room visits.    Past Medical History:   Diagnosis Date    Anxiety     Depression     HIV (human immunodeficiency virus infection) (HCC)     Snoring        Past Surgical History:   Procedure Laterality Date    ANOSCOPY  2013    OTHER ORTHOPEDIC SURGERY Right 2012    meniscus    INGUINAL HERNIA LAPAROSCOPIC BILATERAL         Current Facility-Administered Medications   Medication Dose Route Frequency Provider Last Rate Last Admin    lidocaine (XYLOCAINE) 1 % injection 0.5 mL  0.5 mL Intradermal Once PRN Edson Amaya M.D.        lactated ringers infusion   Intravenous Continuous Edson Amaya M.D. 10 mL/hr at 10/28/22 1208 New Bag at 10/28/22 1208       Social History     Socioeconomic History    Marital status: Single     Spouse name: Not on file    Number of children: Not on file    Years of education: Not on file    Highest education level: Not on file   Occupational History    Not on file   Tobacco Use    Smoking status: Never    Smokeless tobacco: Never   Vaping Use    Vaping Use: Never used   Substance and Sexual Activity    Alcohol use: Yes     Alcohol/week: 1.2 oz     Types: 2 Cans of beer per week    Drug use: No    Sexual activity: Yes     Partners: Male     Birth control/protection: Condom   Other Topics Concern    Not on file   Social History Narrative    Not on file     Social Determinants of Health     Financial Resource Strain: Not on file   Food Insecurity: Not on file   Transportation Needs: Not on file   Physical  Activity: Not on file   Stress: Not on file   Social Connections: Not on file   Intimate Partner Violence: Not on file   Housing Stability: Not on file       Family History   Problem Relation Age of Onset    Thyroid Mother     Hypertension Father     Blood Disease Sister         Sicle cell carrier    Diabetes Maternal Grandfather        Allergies  Amoxicillin    Review of Systems  Negative    Physical Exam  Vitals and nursing note reviewed.   Constitutional:       Appearance: Normal appearance.   HENT:      Head: Normocephalic.      Nose: Nose normal.      Mouth/Throat:      Mouth: Mucous membranes are moist.   Eyes:      Extraocular Movements: Extraocular movements intact.   Cardiovascular:      Rate and Rhythm: Normal rate and regular rhythm.      Pulses: Normal pulses.      Heart sounds: Normal heart sounds.   Pulmonary:      Effort: Pulmonary effort is normal.   Abdominal:      General: Abdomen is flat.      Palpations: Abdomen is soft.   Musculoskeletal:         General: Normal range of motion.      Cervical back: Normal range of motion.   Skin:     General: Skin is warm and dry.   Neurological:      Mental Status: He is alert and oriented to person, place, and time.   Psychiatric:         Mood and Affect: Mood normal.         Behavior: Behavior normal.         Thought Content: Thought content normal.         Judgment: Judgment normal.       Vital Signs  Blood Pressure: 120/76   Temperature: 36.3 °C (97.3 °F)   Pulse: 67   Respiration: 17   Pulse Oximetry: 96 %       Labs:                    Radiology:  No orders to display         Assessment/Plan:  37-year-old male with HIV and low-grade squamous intraepithelial lesion found on anal Pap smear  Procedure(s):  HIGH RESOLUTION ANOSCOPY WITH ANAL BIOPSY  Risks, benefits, and alternatives were discussed with consenting person(s). Consenting person(s) were given an opportunity to ask questions and discuss other options. Risks including but not limited to failed or  incomplete planned procedure, ineffective therapy, perforation, infection, bleeding, missed lesion(s), missed diagnosis, cardiac and/or pulmonary event, aspiration, stroke, possible need for surgery, hospitalization possibly prolonged, discomfort, unsuccessful and/or incomplete procedure, possible need for repeat procedures and/or additional testings, damage to adjacent organs and/or vascular structures, medication reaction, disability, death, and other adverse events possibly life-threatening. Discussion was undertaken with Layman's terms. Consenting persons stated understanding and acceptance of these risks, and wished to proceed. Consent was given in clear state of mind.  Edson Amaya MD PhD  [facility]  Colon and Rectal Surgeon  [phone]

## 2022-10-28 NOTE — ANESTHESIA PREPROCEDURE EVALUATION
Case: [id number] Date/Time: 10/28/22 1345    Procedure: HIGH RESOLUTION ANOSCOPY WITH ANAL BIOPSY (Anus)    Pre-op diagnosis: LOW GRADE SQUAMOUS INTRAEPITHELIAL LESION ON ANAL PAPANICOLAOU SMEAR    Location: [place] [address] / SURGERY [facility]    Surgeons: Edson Amaya M.D.          Relevant Problems   No relevant active problems       Physical Exam    Airway   Mallampati: II  TM distance: >3 FB  Neck ROM: full       Cardiovascular - normal exam  Rhythm: regular  Rate: normal  (-) murmur     Dental - normal exam           Pulmonary - normal exam  Breath sounds clear to auscultation     Abdominal    Neurological - normal exam                 Anesthesia Plan    ASA 3   ASA physical status 3 criteria: other (comment)    Plan - general       Airway plan will be ETT          Induction: intravenous    Postoperative Plan: Postoperative administration of opioids is intended.    Pertinent diagnostic labs and testing reviewed    Informed Consent:    Anesthetic plan and risks discussed with patient.    Use of blood products discussed with: patient whom consented to blood products.

## 2022-10-28 NOTE — ANESTHESIA POSTPROCEDURE EVALUATION
Patient: Maurice Arana    Procedure Summary     Date: 10/28/22 Room / Location: [place] [address] / SURGERY [facility]    Anesthesia Start: 1508 Anesthesia Stop: 1605    Procedure: HIGH RESOLUTION ANOSCOPY WITH ANAL BIOPSY (Anus) Diagnosis: (HIV and low-grade squamous intraepithelial lesion )    Surgeons: Edson Amaya M.D. Responsible Provider: Ramón Westbrook M.D.    Anesthesia Type: general ASA Status: 3          Final Anesthesia Type: general  Last vitals  BP   Blood Pressure: 124/58    Temp   36.9 °C (98.4 °F)    Pulse   94   Resp   (!) 22    SpO2   96 %      Anesthesia Post Evaluation    Patient location during evaluation: PACU  Patient participation: complete - patient participated  Level of consciousness: awake and alert    Airway patency: patent  Anesthetic complications: no  Cardiovascular status: hemodynamically stable  Respiratory status: acceptable  Hydration status: euvolemic    PONV: none          There were no known notable events for this encounter.     Nurse Pain Score: 0 (NPRS)

## 2022-10-28 NOTE — ANESTHESIA TIME REPORT
Anesthesia Start and Stop Event Times     Date Time Event    10/28/2022 1507 Ready for Procedure     1508 Anesthesia Start     1609 Anesthesia Stop        Responsible Staff  10/28/22    Name Role Begin End    Ramón Westbrook M.D. Anesth 1508 1609        Overtime Reason:  overtime    Comments:

## 2022-10-28 NOTE — ANESTHESIA PROCEDURE NOTES
Airway    Date/Time: 10/28/2022 3:29 PM  Performed by: Ramón Westbrook M.D.  Authorized by: Ramón Westbrook M.D.     Location:  OR  Urgency:  Elective  Indications for Airway Management:  Anesthesia      Spontaneous Ventilation: absent    Sedation Level:  Deep  Preoxygenated: Yes    Patient Position:  Sniffing  Final Airway Type:  Endotracheal airway  Final Endotracheal Airway:  ETT  Cuffed: Yes    Technique Used for Successful ETT Placement:  Direct laryngoscopy    Insertion Site:  Oral  Blade Type:  Nela  Laryngoscope Blade/Videolaryngoscope Blade Size:  4  ETT Size (mm):  8.0  Measured from:  Teeth  ETT to Teeth (cm):  22  Placement Verified by: auscultation and capnometry    Cormack-Lehane Classification:  Grade I - full view of glottis  Number of Attempts at Approach:  1

## 2022-10-28 NOTE — OP REPORT
DATE OF OPERATION: 10/28/2022    PREOPERATIVE DIAGNOSIS:    HIV  Abnormal anal Pap smear with low-grade intraepithelial lesions identified    POSTOPERATIVE DIAGNOSIS: Same    PROCEDURE PERFORMED:  1.  High resolution anoscopy with staining with Lugol's and acetic acid  2.  Rectal biopsies      SURGEON: Edson Amaya M.D.    ASSISTANT: None    ANESTHESIOLOGIST: Ramón Westbrook M.D.    ANESTHESIA:  General endotracheal anesthesia    ASA CLASSIFICATION: II.    INDICATION:  The patient is a 37 y.o. male with HIV and abnormal anal Pap. He is taken to the operating room for high-resolution anoscopy with anal biopsy.    FINDINGS: Abnormal staining identified in the right lateral and left lateral positions.    WOUND CLASSIFICATION: Class III, Contaminated.    SPECIMEN: Right lateral and left lateral positions.    ESTIMATED BLOOD LOSS:  5 mL.    PROCEDURE:    The patient was brought to the operating room placed under monitored anesthesia care with bilateral SCDs in place. The patient was then moved into the prone jackknife position.  a timeout was completed verifying the correct patient and procedure site positioning and availability of equipment prior to the start of surgery.  The buttocks were taped apart and the perineum was prepped and draped in the usual standard sterile fashion.  Half percent Marcaine with epinephrine was injected as a perianal nerve block. The anus was carefully dilated until 3 fingers could be introduced.  An anoscope was introduced and the 3 hemorrhoidal pedicles were identified.  Using a microscope I visualized the anal canal and the squamocolumnar junction and examined it carefully and found no areas of abnormalities.  I then placed 3% acetic acid within the anal canal left in place for 1 minute.  I then made a global survey of the entire anal canal and observe no abnormalities.  I then proceeded quadrant by quadrant with Lugol's solution and identified abnormalities.  I did take biopsies in  the right lateral and left lateral position and used electrocautery for hemostasis.  I removed the anoscope and reapplied the acetic acid to the anal verge and then carefully examined and found no masses or abnormalities.  At the conclusion of the case the sponge and instrument counts were correct x2.  A gauze pad was tucked between the gluteal folds. The patient tolerated the procedure well and was awakened and taken to the postanesthesia care unit in stable condition.

## 2022-10-29 NOTE — DISCHARGE INSTRUCTIONS
HOME CARE INSTRUCTIONS    ACTIVITY: Rest and take it easy for the first 24 hours.  A responsible adult is recommended to remain with you during that time.  It is normal to feel sleepy.  We encourage you to not do anything that requires balance, judgment or coordination.    FOR 24 HOURS DO NOT:  Drive, operate machinery or run household appliances.  Drink beer or alcoholic beverages.  Make important decisions or sign legal documents.    SPECIAL INSTRUCTIONS: Follow MD instructions.    DIET: To avoid nausea, slowly advance diet as tolerated, avoiding spicy or greasy foods for the first day.  Add more substantial food to your diet according to your physician's instructions.  Babies can be fed formula or breast milk as soon as they are hungry.  INCREASE FLUIDS AND FIBER TO AVOID CONSTIPATION.    SURGICAL DRESSING/BATHING: You may shower.     MEDICATIONS: Resume taking daily medication.  Take prescribed pain medication with food.  If no medication is prescribed, you may take non-aspirin pain medication if needed.  PAIN MEDICATION CAN BE VERY CONSTIPATING.  Take a stool softener or laxative such as senokot, pericolace, or milk of magnesia if needed.    Last pain medication given at 4:11pm.    A follow-up appointment should be arranged with your doctor in 1-2 weeks; call to schedule.    You should CALL YOUR PHYSICIAN if you develop:  Fever greater than 101 degrees F.  Pain not relieved by medication, or persistent nausea or vomiting.  Excessive bleeding (blood soaking through dressing) or unexpected drainage from the wound.  Extreme redness or swelling around the incision site, drainage of pus or foul smelling drainage.  Inability to urinate or empty your bladder within 8 hours.  Problems with breathing or chest pain.    You should call 911 if you develop problems with breathing or chest pain.  If you are unable to contact your doctor or surgical center, you should go to the nearest emergency room or urgent care center.   Physician's telephone #: [phone]    MILD FLU-LIKE SYMPTOMS ARE NORMAL.  YOU MAY EXPERIENCE GENERALIZED MUSCLE ACHES, THROAT IRRITATION, HEADACHE AND/OR SOME NAUSEA.    If any questions arise, call your doctor.  If your doctor is not available, please feel free to call the Surgical Center at [phone].  The Center is open Monday through Friday from 7AM to 7PM.      A registered nurse may call you a few days after your surgery to see how you are doing after your procedure.    You may also receive a survey in the mail within the next two weeks and we ask that you take a few moments to complete the survey and return it to us.  Our goal is to provide you with very good care and we value your comments.     Depression / Suicide Risk    As you are discharged from this [facility] facility, it is important to learn how to keep safe from harming yourself.    Recognize the warning signs:  Abrupt changes in personality, positive or negative- including increase in energy   Giving away possessions  Change in eating patterns- significant weight changes-  positive or negative  Change in sleeping patterns- unable to sleep or sleeping all the time   Unwillingness or inability to communicate  Depression  Unusual sadness, discouragement and loneliness  Talk of wanting to die  Neglect of personal appearance   Rebelliousness- reckless behavior  Withdrawal from people/activities they love  Confusion- inability to concentrate     If you or a loved one observes any of these behaviors or has concerns about self-harm, here's what you can do:  Talk about it- your feelings and reasons for harming yourself  Remove any means that you might use to hurt yourself (examples: pills, rope, extension cords, firearm)  Get professional help from the community (Mental Health, Substance Abuse, psychological counseling)  Do not be alone:Call your Safe Contact- someone whom you trust who will be there for you.  Call your local CRISIS HOTLINE  305-5928 or 613-294-4173  Call your local Children's Mobile Crisis Response Team [place] (418) 438-6827 or www.[employer]  Call the toll free National Suicide Prevention Hotlines   National Suicide Prevention Lifeline 865-368-ZSIC (9941)  National [employer] 800-SUICIDE (918-5600)    I acknowledge receipt and understanding of these Home Care instructions.

## 2023-02-22 ENCOUNTER — APPOINTMENT (OUTPATIENT)
Dept: RADIOLOGY | Facility: IMAGING CENTER | Age: 38
End: 2023-02-22
Attending: NURSE PRACTITIONER
Payer: COMMERCIAL

## 2023-02-22 ENCOUNTER — OFFICE VISIT (OUTPATIENT)
Dept: URGENT CARE | Facility: CLINIC | Age: 38
End: 2023-02-22
Payer: COMMERCIAL

## 2023-02-22 VITALS
HEART RATE: 65 BPM | RESPIRATION RATE: 16 BRPM | OXYGEN SATURATION: 97 % | BODY MASS INDEX: 32.65 KG/M2 | WEIGHT: 208 LBS | SYSTOLIC BLOOD PRESSURE: 146 MMHG | HEIGHT: 67 IN | DIASTOLIC BLOOD PRESSURE: 82 MMHG | TEMPERATURE: 97.3 F

## 2023-02-22 DIAGNOSIS — S97.82XA CRUSHING INJURY OF LEFT FOOT, INITIAL ENCOUNTER: ICD-10-CM

## 2023-02-22 DIAGNOSIS — S99.922A FOOT INJURY, LEFT, INITIAL ENCOUNTER: ICD-10-CM

## 2023-02-22 DIAGNOSIS — S99.912A INJURY OF LEFT ANKLE, INITIAL ENCOUNTER: ICD-10-CM

## 2023-02-22 DIAGNOSIS — S93.402A SPRAIN OF LEFT ANKLE, UNSPECIFIED LIGAMENT, INITIAL ENCOUNTER: ICD-10-CM

## 2023-02-22 PROCEDURE — 73610 X-RAY EXAM OF ANKLE: CPT | Mod: TC,LT | Performed by: NURSE PRACTITIONER

## 2023-02-22 PROCEDURE — 73630 X-RAY EXAM OF FOOT: CPT | Mod: TC,LT | Performed by: NURSE PRACTITIONER

## 2023-02-22 PROCEDURE — 99214 OFFICE O/P EST MOD 30 MIN: CPT | Performed by: NURSE PRACTITIONER

## 2023-02-22 NOTE — LETTER
February 22, 2023         Patient: Maurice Arana   YOB: 1985   Date of Visit: 2/22/2023           To Whom it May Concern:    Maurice Arana was seen in my clinic on 2/22/2023. He may be excused from work tomorrow and possibly Friday if needed, due to injury.    If you have any questions or concerns, please don't hesitate to call.        Sincerely,           ALEJANDRA Lipscomb.  Electronically Signed

## 2023-02-23 NOTE — PROGRESS NOTES
Chief Complaint   Patient presents with    Ankle Injury     L ankle       HISTORY OF PRESENT ILLNESS: Patient is a pleasant 37 y.o. male who presents to urgent care today with complaints of left foot and ankle injury.  Patient notes that a chair that he was sitting in earlier today collapsed, causing his left leg to be entangled in the legs.  He immediately developed pain to his left ankle and foot, he has had pain since.  This occurred just prior to clinic arrival.  He has tried ice for symptom relief.  Denies previous injuries to this ankle or foot.    Patient Active Problem List    Diagnosis Date Noted    LGSIL Pap smear of anus 10/28/2022    HIV (human immunodeficiency virus infection) ([facility]) 02/02/2018    Bipolar 1 disorder ([facility]) 02/02/2018    RLS (restless legs syndrome) 02/02/2018    Health care maintenance 02/02/2018       Allergies:Amoxicillin    Current Outpatient Medications Ordered in Epic   Medication Sig Dispense Refill    Ubuppnkzxq-Ylapfryf-Wftotuf AF (ODEFSEY) 200-25-25 MG Tab Take 1 Tab by mouth every bedtime. 90 Tab 0     No current Epic-ordered facility-administered medications on file.       Past Medical History:   Diagnosis Date    Anxiety     Depression     HIV (human immunodeficiency virus infection) ([facility])     Snoring        Social History     Tobacco Use    Smoking status: Never    Smokeless tobacco: Never   Vaping Use    Vaping Use: Never used   Substance Use Topics    Alcohol use: Yes     Alcohol/week: 1.2 oz     Types: 2 Cans of beer per week    Drug use: No       Family Status   Relation Name Status    Mo  (Not Specified)    Fa  (Not Specified)    Sis  (Not Specified)    MGFa  (Not Specified)     Family History   Problem Relation Age of Onset    Thyroid Mother     Hypertension Father     Blood Disease Sister         Sicle cell carrier    Diabetes Maternal Grandfather        ROS:  Review of Systems   Constitutional: Negative for fever, chills, weight loss, malaise, and fatigue.   HENT:  "Negative for ear pain, nosebleeds, congestion, sore throat and neck pain.    Eyes: Negative for vision changes.   Neuro: Negative for headache, sensory changes, weakness, seizure, LOC.   Cardiovascular: Negative for chest pain, palpitations, orthopnea and leg swelling.   Respiratory: Negative for cough, sputum production, shortness of breath and wheezing.   Gastrointestinal: Negative for abdominal pain, nausea, vomiting or diarrhea.   Genitourinary: Negative for dysuria, urgency and frequency.  Musculoskeletal: Positive for falls, left foot and ankle pain.  Negative for neck pain, back pain, myalgias.   Skin: Negative for rash, diaphoresis.     Exam:  BP (!) 146/82 (BP Location: Left arm, Patient Position: Sitting, BP Cuff Size: Adult)   Pulse 65   Temp 36.3 °C (97.3 °F) (Temporal)   Resp 16   Ht 1.702 m (5' 7\")   Wt 94.3 kg (208 lb)   SpO2 97%   General: well-nourished, well-developed male in NAD  Head: normocephalic, atraumatic  Eyes: PERRLA, no conjunctival injection, acuity grossly intact, lids normal.  Ears: normal shape and symmetry, no tenderness, no discharge. External canals are without any significant edema or erythema. Tympanic membranes are without any inflammation, no effusion. Gross auditory acuity is intact.  Nose: symmetrical without tenderness, no discharge.  Mouth/Throat: reasonable hygiene, no erythema, exudates or tonsillar enlargement.  Neck: no masses, range of motion within normal limits, no tracheal deviation. No obvious thyroid enlargement.   Lymph: no cervical adenopathy. No supraclavicular adenopathy.   Neuro: alert and oriented. Cranial nerves 1-12 grossly intact. No sensory deficit.   Cardiovascular: regular rate and rhythm. No edema.  Pulmonary: no distress. Chest is symmetrical with respiration, no wheezes, crackles, or rhonchi.   Musculoskeletal: no clubbing, appropriate muscle tone, gait is antalgic.  Left foot: Normal appearance with tenderness over calcaneus and middle 3 " "toes.  No deformity noted.  Left ankle: Tenderness to bilateral malleolus, more prominent on medial aspect, limited range of motion.  Left knee: Normal appearance without any tenderness.  Skin: warm, dry, intact, no clubbing, no cyanosis, no rashes.   Psych: appropriate mood, affect, judgement.       DX left ankle radiology \"No radiographic evidence of acute traumatic injury.\"      Dx left foot radiology reading \"No acute fracture or dislocation is noted. If symptoms persist, follow-up radiographs can be obtained in 7-10 days.\"      Assessment/Plan:  1. Sprain of left ankle, unspecified ligament, initial encounter  DX-ANKLE 3+ VIEWS LEFT    Referral to Orthopedics      2. Crushing injury of left foot, initial encounter  DX-FOOT-COMPLETE 3+ LEFT    Referral to Orthopedics            Patient presents with left foot and ankle injury.  X-rays negative for any acute bony process, suspect sprain.  Patient placed in walking boot, given crutches for comfort.  OTC NSAIDs or Tylenol.  RICE.  Referral placed to Ortho for follow-up.  Supportive care, differential diagnoses, and indications for immediate follow-up discussed with patient.   Pathogenesis of diagnosis discussed including typical length and natural progression.   Instructed to return to clinic or nearest emergency department for any change in condition, further concerns, or worsening of symptoms.  Patient states understanding of the plan of care and discharge instructions.  Instructed to make an appointment, for follow up, with his primary care provider.        Please note that this dictation was created using voice recognition software. I have made every reasonable attempt to correct obvious errors, but I expect that there are errors of grammar and possibly content that I did not discover before finalizing the note. I spent a total of 35 minutes with record review, exam, communication with the patient, and documentation of this encounter.        Florinda Nelson, " ALICIA

## (undated) DEVICE — SET EXTENSION WITH 2 PORTS (48EA/CA) ***PART #2C8610 IS A SUBSTITUTE*****

## (undated) DEVICE — BOVIE NEEDLE TIP 3CM COLORADO

## (undated) DEVICE — LACTATED RINGERS INJ 1000 ML - (14EA/CA 60CA/PF)

## (undated) DEVICE — PACK MINOR BASIN - (2EA/CA)

## (undated) DEVICE — BAG SPONGE COUNT 10.25 X 32 - BLUE (250/CA)

## (undated) DEVICE — TUBING CLEARLINK DUO-VENT - C-FLO (48EA/CA)

## (undated) DEVICE — GLOVE SZ 8 BIOGEL PI MICRO - PF LF (50PR/BX)

## (undated) DEVICE — APPLICATOR COTTON TIP 6 IN - STERILE (2EA/PK 100PK/BX)

## (undated) DEVICE — GOWN WARMING STANDARD FLEX - (30/CA)

## (undated) DEVICE — CONTAINER SPECIMEN BAG OR - STERILE 4 OZ W/LID (100EA/CA)

## (undated) DEVICE — SUCTION INSTRUMENT YANKAUER BULBOUS TIP W/O VENT (50EA/CA)

## (undated) DEVICE — COVER LIGHT HANDLE ALC PLUS DISP (18EA/BX)

## (undated) DEVICE — SENSOR OXIMETER ADULT SPO2 RD SET (20EA/BX)

## (undated) DEVICE — DRAPE LAPAROTOMY T SHEET - (12EA/CA)

## (undated) DEVICE — ARMREST CRADLE FOAM - (2PR/PK 12PR/CA)

## (undated) DEVICE — CANISTER SUCTION 3000ML MECHANICAL FILTER AUTO SHUTOFF MEDI-VAC NONSTERILE LF DISP  (40EA/CA)

## (undated) DEVICE — TOWEL STOP TIMEOUT SAFETY FLAG (40EA/CA)